# Patient Record
Sex: MALE | Race: WHITE | NOT HISPANIC OR LATINO | Employment: FULL TIME | URBAN - METROPOLITAN AREA
[De-identification: names, ages, dates, MRNs, and addresses within clinical notes are randomized per-mention and may not be internally consistent; named-entity substitution may affect disease eponyms.]

---

## 2023-04-18 RX ORDER — OMEPRAZOLE 40 MG/1
40 CAPSULE, DELAYED RELEASE ORAL DAILY
COMMUNITY
End: 2023-04-20 | Stop reason: SDUPTHER

## 2023-04-18 RX ORDER — AMLODIPINE BESYLATE 5 MG/1
1 TABLET ORAL DAILY
COMMUNITY
Start: 2022-08-06 | End: 2023-09-19 | Stop reason: SDUPTHER

## 2023-04-18 RX ORDER — POTASSIUM CHLORIDE 750 MG/1
1 TABLET, EXTENDED RELEASE ORAL DAILY
COMMUNITY
Start: 2022-08-01

## 2023-04-18 RX ORDER — LOVASTATIN 20 MG/1
20 TABLET ORAL DAILY
COMMUNITY
End: 2023-11-27 | Stop reason: SDUPTHER

## 2023-04-18 RX ORDER — ROSUVASTATIN CALCIUM 20 MG/1
1 TABLET, COATED ORAL DAILY
COMMUNITY
Start: 2022-10-04 | End: 2024-01-22 | Stop reason: SDUPTHER

## 2023-04-18 RX ORDER — BUMETANIDE 1 MG/1
1 TABLET ORAL DAILY
COMMUNITY
Start: 2022-08-01

## 2023-04-18 RX ORDER — MIRTAZAPINE 7.5 MG/1
TABLET, FILM COATED ORAL
COMMUNITY
Start: 2022-07-18

## 2023-04-18 NOTE — PROGRESS NOTES
Subjective   Sven Gardiner is a 67 y.o. male who presents for patient is here for a follow-up.  HPI  Patient is a 67-year-old male with known history of benign prostatic hypertrophy dyslipidemia arthritis Mann's esophagus follows with gastroenterology Dr. Kt Orozco dyslipidemia benign essential hypertension constipation.  Patient is here for follow-up he takes his medication as prescribed patient is here for fasting blood work was major medical denies chest pain shortness of breath fever chills nausea vomiting. Post nasal drip cough.  History of deviated septum  Review of Systems  10 system reviewed patient pertinent as above  Objective     Visit Vitals  /80   Pulse 70   Temp 36.3 °C (97.3 °F)      Physical Exam  HEENT: Atraumatic normocephalic the pupils are equal and round and reactive to light the sclerae nonicteric extraocular motion are intact.  Neck: Is supple without JVD no carotid bruits the trachea is midline there are no masses pulses are equal and bilateral with normal upstroke.  Skin: Normal.  Skin good texture.  Moist.  Good turgor.  No lesions, no rashes.  Lymph: No lymphadenopathy appreciated, no masses, no lesions  Lungs: Are clear to auscultation and percussion, good breath sounds bilaterally, no rhonchi, no wheezing, good diaphragmatic excursion.  Heart: Normal rate and normal rhythm S1, S2, no S3, no gallop, murmur or rub.  Abdomen: Soft, nontender, no organomegaly, good bowel sounds.    Extremities: Full range of motion, good pulses bilateral.  No cyanosis, no clubbing or edema.  Neuro: Cranial nerves II-XII are grossly intact there is no sensory or motor deficits.  Able to move all extremities.      Assessment/Plan     Seasonal allergies  With nasal drip  Sinus congestion clear discharge  Itchy eyes  With otalgia  Start Flonase 1 puff each nostril twice daily    Benign prostatic hypertrophy  On tamsulosin doing well still getting up once or twice  Patient wishes to have better  control  Struct patient drink before bedtime  And finasteride      Patient will come in for fasting blood work in July    CBC BMP lipids AST ALT vitamin D 25-hydroxy    Continue with the low-fat, low-cholesterol diet,  I recommended Mediterranean diet, which include fish, chicken, vegetables and olive oil  Exercise daily for 30 minutes at least 3 times a week  Continue home medications    Hypertension  No added salt diet, do not and salt to your food  Try to exercise every other day for 30 minutes  Continue current medications    Gastroesophageal reflux disease  Continue current medications  Include spicy foods do not eat late at night  Do not wear tight fitting clothes  Exercise daily    Insomnia  On mirtazapine    Constipation  Follows with gastroenterology  We discussed the possibility of stopping amlodipine as a cause of constipation  Problem List Items Addressed This Visit          Nervous    Spinal stenosis - Primary       Circulatory    HTN (hypertension), benign       Digestive    Esophageal reflux       Genitourinary    BPH (benign prostatic hyperplasia)    Relevant Medications    finasteride (Proscar) 5 mg tablet    omeprazole (PriLOSEC) 40 mg DR capsule       Endocrine/Metabolic    Hyperthyroidism       Other    Dyslipidemia     Other Visit Diagnoses       Seasonal allergies        Relevant Medications    fluticasone (Flonase) 50 mcg/actuation nasal spray              Didier Pennington MD

## 2023-04-20 ENCOUNTER — OFFICE VISIT (OUTPATIENT)
Dept: PRIMARY CARE | Facility: CLINIC | Age: 68
End: 2023-04-20
Payer: COMMERCIAL

## 2023-04-20 VITALS
TEMPERATURE: 97.3 F | HEIGHT: 69 IN | BODY MASS INDEX: 27.7 KG/M2 | SYSTOLIC BLOOD PRESSURE: 120 MMHG | WEIGHT: 187 LBS | HEART RATE: 70 BPM | DIASTOLIC BLOOD PRESSURE: 80 MMHG

## 2023-04-20 DIAGNOSIS — K21.00 GASTROESOPHAGEAL REFLUX DISEASE WITH ESOPHAGITIS, UNSPECIFIED WHETHER HEMORRHAGE: ICD-10-CM

## 2023-04-20 DIAGNOSIS — E05.90 HYPERTHYROIDISM: ICD-10-CM

## 2023-04-20 DIAGNOSIS — J30.2 SEASONAL ALLERGIES: ICD-10-CM

## 2023-04-20 DIAGNOSIS — N40.1 BENIGN PROSTATIC HYPERPLASIA WITH LOWER URINARY TRACT SYMPTOMS, SYMPTOM DETAILS UNSPECIFIED: ICD-10-CM

## 2023-04-20 DIAGNOSIS — M48.00 SPINAL STENOSIS, UNSPECIFIED SPINAL REGION: Primary | ICD-10-CM

## 2023-04-20 DIAGNOSIS — I10 HTN (HYPERTENSION), BENIGN: ICD-10-CM

## 2023-04-20 DIAGNOSIS — E78.5 DYSLIPIDEMIA: ICD-10-CM

## 2023-04-20 PROBLEM — R09.81 NASAL CONGESTION: Status: ACTIVE | Noted: 2023-04-20

## 2023-04-20 PROBLEM — K21.9 ESOPHAGEAL REFLUX: Status: ACTIVE | Noted: 2023-04-20

## 2023-04-20 PROBLEM — D75.1 POLYCYTHEMIA: Status: ACTIVE | Noted: 2023-04-20

## 2023-04-20 PROBLEM — F41.9 ANXIETY: Status: ACTIVE | Noted: 2023-04-20

## 2023-04-20 PROBLEM — G25.81 RESTLESS LEGS: Status: ACTIVE | Noted: 2023-04-20

## 2023-04-20 PROBLEM — K22.70 BARRETT'S ESOPHAGUS WITHOUT DYSPLASIA: Status: ACTIVE | Noted: 2023-04-20

## 2023-04-20 PROBLEM — H69.91 DYSFUNCTION OF RIGHT EUSTACHIAN TUBE: Status: ACTIVE | Noted: 2023-04-20

## 2023-04-20 PROBLEM — N40.0 BPH (BENIGN PROSTATIC HYPERPLASIA): Status: ACTIVE | Noted: 2023-04-20

## 2023-04-20 PROBLEM — R06.83 SNORING: Status: ACTIVE | Noted: 2023-04-20

## 2023-04-20 PROBLEM — R07.89 CHEST PAIN, ATYPICAL: Status: ACTIVE | Noted: 2023-04-20

## 2023-04-20 PROBLEM — J34.2 NASAL SEPTAL DEVIATION: Status: ACTIVE | Noted: 2023-04-20

## 2023-04-20 PROBLEM — M23.204 OLD TEAR OF MEDIAL MENISCUS OF LEFT KNEE: Status: ACTIVE | Noted: 2023-04-20

## 2023-04-20 PROBLEM — J34.3 HYPERTROPHY OF INFERIOR NASAL TURBINATE: Status: ACTIVE | Noted: 2023-04-20

## 2023-04-20 PROCEDURE — 3074F SYST BP LT 130 MM HG: CPT | Performed by: INTERNAL MEDICINE

## 2023-04-20 PROCEDURE — 1036F TOBACCO NON-USER: CPT | Performed by: INTERNAL MEDICINE

## 2023-04-20 PROCEDURE — 99214 OFFICE O/P EST MOD 30 MIN: CPT | Performed by: INTERNAL MEDICINE

## 2023-04-20 PROCEDURE — 1159F MED LIST DOCD IN RCRD: CPT | Performed by: INTERNAL MEDICINE

## 2023-04-20 PROCEDURE — 3079F DIAST BP 80-89 MM HG: CPT | Performed by: INTERNAL MEDICINE

## 2023-04-20 RX ORDER — FLUTICASONE PROPIONATE 50 MCG
1 SPRAY, SUSPENSION (ML) NASAL 2 TIMES DAILY
Qty: 16 G | Refills: 5 | Status: SHIPPED | OUTPATIENT
Start: 2023-04-20 | End: 2023-09-11

## 2023-04-20 RX ORDER — OMEPRAZOLE 40 MG/1
40 CAPSULE, DELAYED RELEASE ORAL DAILY
Qty: 90 CAPSULE | Refills: 3 | Status: SHIPPED | OUTPATIENT
Start: 2023-04-20 | End: 2023-07-24 | Stop reason: SDUPTHER

## 2023-04-20 RX ORDER — FINASTERIDE 5 MG/1
5 TABLET, FILM COATED ORAL DAILY
Qty: 30 TABLET | Refills: 11 | Status: SHIPPED | OUTPATIENT
Start: 2023-04-20 | End: 2023-07-03

## 2023-04-20 ASSESSMENT — ENCOUNTER SYMPTOMS
OCCASIONAL FEELINGS OF UNSTEADINESS: 0
DEPRESSION: 0
LOSS OF SENSATION IN FEET: 1

## 2023-04-20 ASSESSMENT — COLUMBIA-SUICIDE SEVERITY RATING SCALE - C-SSRS
1. IN THE PAST MONTH, HAVE YOU WISHED YOU WERE DEAD OR WISHED YOU COULD GO TO SLEEP AND NOT WAKE UP?: NO
2. HAVE YOU ACTUALLY HAD ANY THOUGHTS OF KILLING YOURSELF?: NO
6. HAVE YOU EVER DONE ANYTHING, STARTED TO DO ANYTHING, OR PREPARED TO DO ANYTHING TO END YOUR LIFE?: NO

## 2023-04-20 ASSESSMENT — PAIN SCALES - GENERAL: PAINLEVEL: 0-NO PAIN

## 2023-04-20 ASSESSMENT — PATIENT HEALTH QUESTIONNAIRE - PHQ9
1. LITTLE INTEREST OR PLEASURE IN DOING THINGS: NOT AT ALL
SUM OF ALL RESPONSES TO PHQ9 QUESTIONS 1 AND 2: 0
2. FEELING DOWN, DEPRESSED OR HOPELESS: NOT AT ALL

## 2023-05-13 DIAGNOSIS — N40.1 BENIGN PROSTATIC HYPERPLASIA WITH LOWER URINARY TRACT SYMPTOMS, SYMPTOM DETAILS UNSPECIFIED: ICD-10-CM

## 2023-06-18 DIAGNOSIS — N40.0 BENIGN PROSTATIC HYPERPLASIA WITHOUT LOWER URINARY TRACT SYMPTOMS: ICD-10-CM

## 2023-07-03 RX ORDER — FINASTERIDE 5 MG/1
5 TABLET, FILM COATED ORAL DAILY
Qty: 30 TABLET | Refills: 11 | Status: SHIPPED | OUTPATIENT
Start: 2023-07-03 | End: 2024-05-20 | Stop reason: SDUPTHER

## 2023-07-03 RX ORDER — TAMSULOSIN HYDROCHLORIDE 0.4 MG/1
CAPSULE ORAL
Qty: 90 CAPSULE | Refills: 0 | Status: SHIPPED | OUTPATIENT
Start: 2023-07-03 | End: 2023-09-25 | Stop reason: SDUPTHER

## 2023-07-20 NOTE — PROGRESS NOTES
Subjective   Sven Gardiner is a 67 y.o. male who presents for here for follow-up.  HPI  Sven is 67 male here for follow-up benign prostatic hypertrophy dyslipidemia hypertension gastroesophageal reflux disease in the setting of calcium channel blocker, patient is here for follow-up.  Denies chest pain shortness of breath fever chills nausea vomiting constipation diarrhea dysuria urgency frequency.  Is here for fasting blood work Complaining of fatigue  took Z quill does well good sleep.  Rt shoulder pain   Review of Systems  10 system Reviewed pertinent as above   Objective     Visit Vitals  /84   Pulse 78   Temp 36.7 °C (98.1 °F)   Resp 15      Physical Exam  HEENT: Atraumatic normocephalic the pupils are equal and round and reactive to light the sclerae nonicteric extraocular motion are intact.  Neck: Is supple without JVD no carotid bruits the trachea is midline there are no masses pulses are equal and bilateral with normal upstroke.  Skin: Normal.  Skin good texture.  Moist.  Good turgor.  No lesions, no rashes.  Lymph: No lymphadenopathy appreciated, no masses, no lesions  Lungs: Are clear to auscultation and percussion, good breath sounds bilaterally, no rhonchi, no wheezing, good diaphragmatic excursion.  Heart: Normal rate and normal rhythm S1, S2, no S3, no gallop, murmur or rub.  Abdomen: Soft, nontender, no organomegaly, good bowel sounds.    Extremities: Full range of motion, good pulses bilateral.  No cyanosis, no clubbing or edema.  Neuro: Cranial nerves II-XII are grossly intact there is no sensory or motor deficits.  Able to move all extremities.      Assessment/Plan     We will need fasting blood works    Last blood test done September 30, 2022    Patient wishes to return for blood work since he ate today recommended nursing visit  CBC BMP lipids AST LT vitamin D 20 D 25-hydroxy PSA    Rotator Cuff Tendinis  Exercise stretching  If not improved  Refer to orthopedic surgery    Ortho  follows  For lower back pain recommended  Physical Therapy   Possible MRI if not better    Continue with the low-fat, low-cholesterol diet,  I recommended Mediterranean diet, which include fish, chicken, vegetables and olive oil  Exercise daily for 30 minutes at least 3 times a week  Continue home medications    Benign prostatic hypertrophy  On tamsulosin  Added finasteride    Hypertension  No added salt diet, do not and salt to your food  Try to exercise every other day for 30 minutes  Continue current medications    Chronic constipation  Fiber fluids  May need to stop amlodipine    Gastroesophageal reflux disease  Continue current medications  Include spicy foods do not eat late at night  Do not wear tight fitting clothes  Exercise daily    Insomnia on mirtazapine    Will need Fasting blood ntest  Ate today6    Problem List Items Addressed This Visit       BPH (benign prostatic hyperplasia)    Relevant Medications    omeprazole (PriLOSEC) 40 mg DR capsule     Other Visit Diagnoses       Oral thrush    -  Primary    Relevant Medications    clotrimazole (Mycelex) 10 mg rosa    Irritable bowel syndrome with predominant constipation        Relevant Medications    polyethylene glycol (Glycolax, Miralax) 17 gram packet              Didier Pennington MD

## 2023-07-24 ENCOUNTER — OFFICE VISIT (OUTPATIENT)
Dept: PRIMARY CARE | Facility: CLINIC | Age: 68
End: 2023-07-24
Payer: COMMERCIAL

## 2023-07-24 VITALS
BODY MASS INDEX: 27.99 KG/M2 | TEMPERATURE: 98.1 F | HEIGHT: 69 IN | WEIGHT: 189 LBS | HEART RATE: 78 BPM | DIASTOLIC BLOOD PRESSURE: 84 MMHG | SYSTOLIC BLOOD PRESSURE: 128 MMHG | RESPIRATION RATE: 15 BRPM

## 2023-07-24 DIAGNOSIS — K58.1 IRRITABLE BOWEL SYNDROME WITH PREDOMINANT CONSTIPATION: ICD-10-CM

## 2023-07-24 DIAGNOSIS — N40.1 BENIGN PROSTATIC HYPERPLASIA WITH LOWER URINARY TRACT SYMPTOMS, SYMPTOM DETAILS UNSPECIFIED: ICD-10-CM

## 2023-07-24 DIAGNOSIS — B37.0 ORAL THRUSH: Primary | ICD-10-CM

## 2023-07-24 PROCEDURE — 99214 OFFICE O/P EST MOD 30 MIN: CPT | Performed by: INTERNAL MEDICINE

## 2023-07-24 PROCEDURE — 3079F DIAST BP 80-89 MM HG: CPT | Performed by: INTERNAL MEDICINE

## 2023-07-24 PROCEDURE — 3074F SYST BP LT 130 MM HG: CPT | Performed by: INTERNAL MEDICINE

## 2023-07-24 PROCEDURE — 1036F TOBACCO NON-USER: CPT | Performed by: INTERNAL MEDICINE

## 2023-07-24 PROCEDURE — 1159F MED LIST DOCD IN RCRD: CPT | Performed by: INTERNAL MEDICINE

## 2023-07-24 PROCEDURE — 1125F AMNT PAIN NOTED PAIN PRSNT: CPT | Performed by: INTERNAL MEDICINE

## 2023-07-24 RX ORDER — OMEPRAZOLE 40 MG/1
40 CAPSULE, DELAYED RELEASE ORAL DAILY
Qty: 90 CAPSULE | Refills: 3 | Status: SHIPPED | OUTPATIENT
Start: 2023-07-24 | End: 2023-07-24 | Stop reason: SDUPTHER

## 2023-07-24 RX ORDER — OMEPRAZOLE 40 MG/1
40 CAPSULE, DELAYED RELEASE ORAL DAILY
Qty: 90 CAPSULE | Refills: 3 | Status: SHIPPED | OUTPATIENT
Start: 2023-07-24

## 2023-07-24 RX ORDER — CLOTRIMAZOLE 10 MG/1
10 LOZENGE ORAL; TOPICAL
Qty: 50 TABLET | Refills: 0 | Status: SHIPPED | OUTPATIENT
Start: 2023-07-24 | End: 2023-08-03

## 2023-07-24 RX ORDER — POLYETHYLENE GLYCOL 3350 17 G/17G
17 POWDER, FOR SOLUTION ORAL DAILY
Qty: 238 G | Refills: 3 | Status: SHIPPED | OUTPATIENT
Start: 2023-07-24

## 2023-07-24 RX ORDER — POLYETHYLENE GLYCOL 3350 17 G/17G
17 POWDER, FOR SOLUTION ORAL DAILY
Qty: 30 PACKET | Refills: 3 | Status: SHIPPED | OUTPATIENT
Start: 2023-07-24 | End: 2023-07-24

## 2023-07-24 ASSESSMENT — PAIN SCALES - GENERAL: PAINLEVEL: 2

## 2023-09-09 DIAGNOSIS — J30.2 SEASONAL ALLERGIES: ICD-10-CM

## 2023-09-11 RX ORDER — FLUTICASONE PROPIONATE 50 MCG
1 SPRAY, SUSPENSION (ML) NASAL 2 TIMES DAILY
Qty: 48 ML | Refills: 1 | Status: SHIPPED | OUTPATIENT
Start: 2023-09-11 | End: 2024-02-21 | Stop reason: SDUPTHER

## 2023-09-11 NOTE — TELEPHONE ENCOUNTER
Mr. Gardiner called office today with update that he had elevated blood pressure over the weekend 170/97 then it came down to 145/87.  He also has a headache and not sure all of what to do.  He's drinking water, but probably not enough.  He would like to speak with you and if you can advise what to do.  He's scheduled for a in office visit 9/19/23.

## 2023-09-12 NOTE — PROGRESS NOTES
Subjective   Sven Gardiner is a 68 y.o. male who presents for here for follow-up.  HPI  Sven is 67 male history of benign essential hypertension gastroesophageal reflux disease enlarged prostate dyslipidemia takes his medication as prescribed last blood work from September 30, 2022 elevated LDL cholesterol low vitamin D patient is here to discuss a few issues, voice unremarkable denies chest pain shortness of breath fever chills nausea vomiting constipation diarrhea dysuria urgency frequency  Review of Systems  10 system review pertinent as above  Objective     Visit Vitals  /80   Pulse 74   Temp 36.5 °C (97.7 °F)   Resp 15      Physical Exam    HEENT: Atraumatic normocephalic the pupils are equal and round and reactive to light the sclerae nonicteric extraocular motion are intact.  Neck: Is supple without JVD no carotid bruits the trachea is midline there are no masses pulses are equal and bilateral with normal upstroke.  Skin: Normal.  Skin good texture.  Moist.  Good turgor.  No lesions, no rashes.  Lymph: No lymphadenopathy appreciated, no masses, no lesions  Lungs: Are clear to auscultation and percussion, good breath sounds bilaterally, no rhonchi, no wheezing, good diaphragmatic excursion.  Heart: Normal rate and normal rhythm S1, S2, no S3, no gallop, murmur or rub.  Abdomen: Soft, nontender, no organomegaly, good bowel sounds.    Extremities: Full range of motion, good pulses bilateral.  No cyanosis, no clubbing or edema.  Neuro: Cranial nerves II-XII are grossly intact there is no sensory or motor deficits.  Able to move all extremities.    Assessment/Plan     Here for follow-up    Fasting blood work    CBC BMP lipids AST LT vitamin D 25 with    Continue with the low-fat, low-cholesterol diet,  I recommended Mediterranean diet, which include fish, chicken, vegetables and olive oil  Exercise daily for 30 minutes at least 3 times a week  Continue home medications  Norvasc 5 mg daily.   CACS score  is 39.41 11/2022    Hypertension  No added salt diet, do not and salt to your food  Try to exercise every other day for 30 minutes  Continue current medications    Large prostate  Normal PSA  Will recheck      Problem List Items Addressed This Visit       BPH (benign prostatic hyperplasia)    Dyslipidemia    Relevant Orders    Lipid Panel    AST    ALT    Esophageal reflux    Relevant Orders    CBC    HTN (hypertension), benign - Primary    Relevant Medications    amLODIPine (Norvasc) 5 mg tablet    Other Relevant Orders    Basic Metabolic Panel    Hyperlipidemia     Other Visit Diagnoses       Nocturia        Relevant Orders    PSA              Didier Pennington MD

## 2023-09-19 ENCOUNTER — OFFICE VISIT (OUTPATIENT)
Dept: PRIMARY CARE | Facility: CLINIC | Age: 68
End: 2023-09-19
Payer: COMMERCIAL

## 2023-09-19 VITALS
DIASTOLIC BLOOD PRESSURE: 80 MMHG | SYSTOLIC BLOOD PRESSURE: 126 MMHG | BODY MASS INDEX: 28.44 KG/M2 | WEIGHT: 192 LBS | HEART RATE: 74 BPM | TEMPERATURE: 97.7 F | HEIGHT: 69 IN | RESPIRATION RATE: 15 BRPM

## 2023-09-19 DIAGNOSIS — E78.2 MODERATE MIXED HYPERLIPIDEMIA NOT REQUIRING STATIN THERAPY: ICD-10-CM

## 2023-09-19 DIAGNOSIS — K21.00 GASTROESOPHAGEAL REFLUX DISEASE WITH ESOPHAGITIS, UNSPECIFIED WHETHER HEMORRHAGE: ICD-10-CM

## 2023-09-19 DIAGNOSIS — E78.5 DYSLIPIDEMIA: ICD-10-CM

## 2023-09-19 DIAGNOSIS — N40.1 BENIGN PROSTATIC HYPERPLASIA WITH LOWER URINARY TRACT SYMPTOMS, SYMPTOM DETAILS UNSPECIFIED: ICD-10-CM

## 2023-09-19 DIAGNOSIS — R35.1 NOCTURIA: ICD-10-CM

## 2023-09-19 DIAGNOSIS — I10 HTN (HYPERTENSION), BENIGN: Primary | ICD-10-CM

## 2023-09-19 PROCEDURE — 84153 ASSAY OF PSA TOTAL: CPT | Performed by: INTERNAL MEDICINE

## 2023-09-19 PROCEDURE — 99214 OFFICE O/P EST MOD 30 MIN: CPT | Performed by: INTERNAL MEDICINE

## 2023-09-19 PROCEDURE — 1036F TOBACCO NON-USER: CPT | Performed by: INTERNAL MEDICINE

## 2023-09-19 PROCEDURE — 80048 BASIC METABOLIC PNL TOTAL CA: CPT | Performed by: INTERNAL MEDICINE

## 2023-09-19 PROCEDURE — 84450 TRANSFERASE (AST) (SGOT): CPT | Performed by: INTERNAL MEDICINE

## 2023-09-19 PROCEDURE — 1126F AMNT PAIN NOTED NONE PRSNT: CPT | Performed by: INTERNAL MEDICINE

## 2023-09-19 PROCEDURE — 3074F SYST BP LT 130 MM HG: CPT | Performed by: INTERNAL MEDICINE

## 2023-09-19 PROCEDURE — 84460 ALANINE AMINO (ALT) (SGPT): CPT | Performed by: INTERNAL MEDICINE

## 2023-09-19 PROCEDURE — 1159F MED LIST DOCD IN RCRD: CPT | Performed by: INTERNAL MEDICINE

## 2023-09-19 PROCEDURE — 80061 LIPID PANEL: CPT | Performed by: INTERNAL MEDICINE

## 2023-09-19 PROCEDURE — 3079F DIAST BP 80-89 MM HG: CPT | Performed by: INTERNAL MEDICINE

## 2023-09-19 PROCEDURE — 85025 COMPLETE CBC W/AUTO DIFF WBC: CPT | Performed by: INTERNAL MEDICINE

## 2023-09-19 RX ORDER — AMLODIPINE BESYLATE 5 MG/1
5 TABLET ORAL DAILY
Qty: 30 TABLET | Refills: 3 | Status: SHIPPED | OUTPATIENT
Start: 2023-09-19 | End: 2024-09-18

## 2023-09-19 ASSESSMENT — PAIN SCALES - GENERAL: PAINLEVEL: 0-NO PAIN

## 2023-09-25 DIAGNOSIS — N40.0 BENIGN PROSTATIC HYPERPLASIA WITHOUT LOWER URINARY TRACT SYMPTOMS: ICD-10-CM

## 2023-09-26 RX ORDER — TAMSULOSIN HYDROCHLORIDE 0.4 MG/1
0.4 CAPSULE ORAL NIGHTLY
Qty: 90 CAPSULE | Refills: 1 | Status: SHIPPED | OUTPATIENT
Start: 2023-09-26

## 2023-10-19 ENCOUNTER — TELEMEDICINE (OUTPATIENT)
Dept: PRIMARY CARE | Facility: CLINIC | Age: 68
End: 2023-10-19
Payer: COMMERCIAL

## 2023-10-19 DIAGNOSIS — J20.9 ACUTE BRONCHITIS, UNSPECIFIED ORGANISM: Primary | ICD-10-CM

## 2023-10-19 PROCEDURE — 99214 OFFICE O/P EST MOD 30 MIN: CPT | Performed by: INTERNAL MEDICINE

## 2023-10-19 RX ORDER — AMOXICILLIN AND CLAVULANATE POTASSIUM 500; 125 MG/1; MG/1
500 TABLET, FILM COATED ORAL 3 TIMES DAILY
Qty: 30 TABLET | Refills: 0 | Status: SHIPPED | OUTPATIENT
Start: 2023-10-19 | End: 2023-10-29

## 2023-10-19 NOTE — PROGRESS NOTES
Subjective   Sven Gardiner is a 68 y.o. male who presents for here for right shoulder visit.  HPI  Sven is 67 male history of benign essential hypertension gastroesophageal reflux disease enlarged prostate dyslipidemia takes his medication as prescribed last blood work from September 30, 2022 elevated LDL cholesterol low vitamin D patient is here for virtual visit complaining of persistent cough postnasal drip now with grayish discharge is no fever chills nausea vomiting, no diaphoresis.  Complaining of persistent sinus pressure and postnasal drip wishes to see an ear nose and throat specialist  Review of Systems  10 system review pertinent as above  Objective   Virtual visit  There were no vitals taken for this visit.     Physical Exam    Virtual visit    Assessment/Plan     Virtual visit    Reviewed fasting blood work.  Patient    Elevated LDL cholesterol  In the setting of normal cardiac calcium score  I recommend diet exercise weight loss  And repeat blood test in 6 months    Persistent postnasal drip  Persistent sinusitis  Refer patient to ear nose and throat for further evaluation    Acute bronchitis  In the setting of postnasal drip  We discussed the possibilities of antibiotic to  Especially denies any change of his sputum consistency from clear to grayish  I have given him Augmentin 500 mg twice a day  To take for period of  7 days  If symptoms continue to improve  Over the next day or 2 do not use antibiotic      Continue with the low-fat, low-cholesterol diet,  I recommended Mediterranean diet, which include fish, chicken, vegetables and olive oil  Exercise daily for 30 minutes at least 3 times a week  Continue home medications  Norvasc 5 mg daily.   CACS score is 39.41 11/2022    Hypertension  No added salt diet, do not and salt to your food  Try to exercise every other day for 30 minutes  Continue current medications    Large prostate  Normal PSA  Will recheck      Problem List Items Addressed This  Visit    None  Visit Diagnoses       Acute bronchitis, unspecified organism    -  Primary    Relevant Medications    amoxicillin-pot clavulanate (Augmentin) 500-125 mg tablet    Other Relevant Orders    Referral to ENT              Didier Pennington MD

## 2023-10-24 DIAGNOSIS — M48.02 SPINAL STENOSIS OF CERVICAL REGION: ICD-10-CM

## 2023-11-07 ENCOUNTER — HOSPITAL ENCOUNTER (OUTPATIENT)
Dept: RADIOLOGY | Facility: HOSPITAL | Age: 68
Discharge: HOME | End: 2023-11-07
Payer: COMMERCIAL

## 2023-11-07 DIAGNOSIS — M48.02 SPINAL STENOSIS OF CERVICAL REGION: ICD-10-CM

## 2023-11-07 PROCEDURE — 72141 MRI NECK SPINE W/O DYE: CPT

## 2023-11-07 PROCEDURE — 72141 MRI NECK SPINE W/O DYE: CPT | Performed by: RADIOLOGY

## 2023-11-21 NOTE — PATIENT INSTRUCTIONS
Your exam today showed that you have chronic sinusitis with nasal polyps and inferior turbinate hypertrophy.    Please get a CT scan of your sinuses at a  facility. Please contact our scheduling and  service at [phone]. They will work with you to schedule your CT scan.    Continue using Flonase nasal spray. Do 1 spray in each nostril twice a day. There is less nasal drying when you space out the Flonase spray to one spray in the morning and one spray in the evening. If that is not possible, you can instead do 2 sprays in each nostril once a day. Nasal spray instructions are below.    Start using azelastine nasal spray. Do 2 sprays in each nostril twice a day. Nasal spray instructions are below.    Nasal spray instructions:  Please take the prescribed nasal sprays (Flonase and azelastine) as directed. BE SURE TO POINT THE SPRAY OUTWARDS TOWARDS THE CORNER OF THE EYE ON THE SAME SIDE NOSTRIL. This will ensure you are treating the appropriate parts of your nose that are swollen or inflamed. Also, avoid sniffing in the spray through your nose as this will cause the spray to go towards the back of your nose and down the back of your throat. Instead, blow out through your mouth while spraying into your nostril. This elevates the soft palate in the back of your mouth which helps the spray to stay in your nose. After spraying, if the fluid starts dripping out of your nose, you can sniff gently to keep the fluid in your nose. It will take take up to 1 month of using Flonase before you notice full benefit. You must use Flonase every day for it to be effective.    Please take omeprazole at least 30 minutes before your evening meal.    Please follow up with Dr. Sawyer in 4-6 weeks for reevaluation or sooner with any questions or concerns. Please feel free to contact his office by calling [phone] with any  questions.  _________________________________________________________________________________________    Welcome to Dr. Colten Fabian’s clinic. We are here to assist you through your ENT care at [facility]. Dr. Colten Fabian is a Rhinologist who is an expert with advanced fellowship training in Endoscopic Sinus Surgery and Skull Base Surgery.    Dr. Colten Fabian’s office number is [phone]. Please use this number to contact his care team regardless of which office you use to access care. This number is the most direct way to communicate with all the members of the care team.    Rylie Arambula CNP is a nurse practitioner who is a part of Dr. Fabian’s team. She will work collaboratively with Dr. Fabian to meet your goals. This often may include seeing you for more urgent appointments or follow-up visits. She follows the same protocols and guidelines for chronic management of your condition.    Hoda Lynch RN is Dr. Fabian’s primary nurse. She can be reached by calling the office as well. Hoda is in clinic Monday through Friday. Non Urgent calls will be returned within 24 hours of the call.    Alice Cleary is Dr. Fabian’s  and she answers the office phone from 9am-4pm Mon-Thurs. On Friday, she answers the phone from 9am-3pm. Call [phone]. She can help you with scheduling of appointments, general questions and information. You may need to leave a message if she is helping another patient. In this case, someone from the team will call you back the same day if you leave your message before 3pm, or the next business morning if after 3pm.    For your convenience, Dr. Fabian sees patients at different [facility] locations including the [facility] and at [facility]. While we try to make your appointments as convenient as possible, occasionally a visit to another location may be necessary to provide the best care for you.    Dr. Fabian makes every effort to run  on time for your appointments. Therefore, if you are more than 15 to 20 minutes late, your appointment may need to be rescheduled to another day. We appreciate your understanding.    We look forward to working with you to meet your healthcare goals.    Scribe Attestation  By signing my name below, I, Cassius Russ, attest that this documentation has been prepared under the direction and in the presence of Colten Sawyer MD. All medical record entries made by the Michael[facility] were at my direction or personally dictated by me. I have reviewed the chart and agree that the record accurately reflects my personal performance of the history, physical exam, discussion and plan.

## 2023-11-21 NOTE — PROGRESS NOTES
HPI   Sven Gardiner is a 68 y.o. male, referred by Didier Pennington MD. The patient presents with concerns of sinus and nose problems that began several years ago. He reports that antibiotics improve sinus symptoms. However, he still has chronic symptoms all year round including #1 nasal drainage, and #2 bilateral nasal obstruction worse on the right. He also reports concern of a sensation of something in his throat. Patient denies facial pressure, facial pain, periorbital edema, epiphora, loss of taste, loss of smell, and epistaxis. The patient has taken Flonase 2 sprays in each nostril once daily for 6-7 months. He has been taking omeprazole 1 tablet daily. Flonase provides some relief. The patient has tried nasal saline irrigations via Navage. This provides relief for a couple hours. Does have a history of allergic rhinitis or allergies. He denies a history of aspirin sensitivity. He reports 2-3 sinus infections per year requiring antibiotic treatment.    The patient reports a history of nasal surgery in approximately  for a broken nose.  Social: Smokes 2 cigarettes per week.    Past Medical History:   Diagnosis Date    Acute maxillary sinusitis, unspecified 2016    Acute maxillary sinusitis, unspecified    Cough, unspecified 2016    Cough    Encounter for screening, unspecified 2016    Screening    Personal history of other specified conditions 10/09/2015    History of abdominal pain       Past Surgical History:   Procedure Laterality Date    OTHER SURGICAL HISTORY  2022    Appendectomy       Social History     Socioeconomic History    Marital status:      Spouse name: Berenice    Number of children: 2    Years of education: Not on file    Highest education level: Not on file   Occupational History    Occupation: Computers   Tobacco Use    Smoking status: Former     Packs/day: .25     Types: Cigarettes     Start date:      Quit date:      Years since quittin.8      Passive exposure: Never    Smokeless tobacco: Never   Vaping Use    Vaping Use: Never used   Substance and Sexual Activity    Alcohol use: Yes     Comment: socially    Drug use: Never    Sexual activity: Yes     Partners: Female   Other Topics Concern    Not on file   Social History Narrative    Not on file     Social Determinants of Health     Financial Resource Strain: Not on file   Food Insecurity: Not on file   Transportation Needs: Not on file   Physical Activity: Not on file   Stress: Not on file   Social Connections: Not on file   Intimate Partner Violence: Not on file   Housing Stability: Not on file       Family History   Problem Relation Name Age of Onset    Heart attack Mother      Diabetes Mother      Ulcers Father         No Known Allergies    Medication Documentation Review Audit       Reviewed by Jeanne Jasmine MA (Medical Assistant) on 09/19/23 at 1525      Medication Order Taking? Sig Documenting Provider Last Dose Status   amLODIPine (Norvasc) 5 mg tablet [id number] No Take 1 tablet (5 mg) by mouth once daily. Kris Lazaro MD Taking Active   bumetanide (Bumex) 1 mg tablet [id number] No Take 1 tablet (1 mg) by mouth once daily. Kris Provider, MD Not Taking Active   finasteride (Proscar) 5 mg tablet [id number]  TAKE 1 TABLET (5 MG) BY MOUTH ONCE DAILY. DO NOT CRUSH, CHEW, OR SPLIT. Didier Pennington MD  Active   fluticasone (Flonase) 50 mcg/actuation nasal spray [id number]  ADMINISTER 1 SPRAY INTO EACH NOSTRIL IN THE MORNING AND 1 SPRAY BEFORE BEDTIME. SHAKE GENTLY. BEFORE FIRST USE, PRIME PUMP. AFTER USE, CLEAN TIP AND REPLACE CAP.. Didier Pennington MD  Active   lovastatin (Mevacor) 20 mg tablet [id number] No Take 1 tablet (20 mg) by mouth once daily. Historical Provider, MD Taking Active   mirtazapine (Remeron) 7.5 mg tablet [id number] No Take by mouth. Kris Provider, MD Not Taking Active   omeprazole (PriLOSEC) 40 mg DR capsule [id number]  Take 1 capsule (40 mg) by mouth once daily. Didier Pennington MD   Active   polyethylene glycol (Glycolax, Miralax) 17 gram/dose powder [id number]  DISSOLVE 17 GRAMS IN 8 OZ OF FLUID LIQUID DRINK DAILY AS DIRECTED Didier Pennington MD  Active   potassium chloride CR 10 mEq ER tablet [id number] No Take 1 tablet (10 mEq) by mouth once daily. Historical Provider, MD Not Taking Active   rosuvastatin (Crestor) 20 mg tablet [id number] No Take 1 tablet (20 mg) by mouth once daily. Historical Provider, MD Not Taking Active   tamsulosin (Flomax) 0.4 mg 24 hr capsule [id number]  TAKE 1 CAPSULE BY MOUTH EVERYDAY AT BEDTIME Didier Pennington MD  Active                    Review of Systems   Negative for constitutional, eyes, cardiac, pulmonary, hepatic, renal, digestive, hematologic, epileptic, syncopal, musculo-skeletal, mental health, integumentary, hypertensive, lipid, arthritic, diabetic, thyroid or neurologic disorders (except as listed in the HPI, PMH and Problem List).    Assessment   68 y.o. 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  11/22/23 - Obstruction/drainage, right sided posterior superior turb polyp, ITH, DNS, concern for CRS. Completed 10 days of Augmentin. CT sinus ordered. Continue Flonase. Rx Azelastine. Continue omeprazole and use before evening meal.    Plan   I personally reviewed the note from Dr. Pennington's office from 10/19/2023. This is contributing to my history and assessment: The patient was diagnosed with persistent postnasal drip, sinusitis, and bronchitis, and was referred to ENT. He was given Augmentin antibiotics for 10 days at that time.    I personally reviewed the patient's x-ray sinus report. I discussed the results personally with the patient. The following findings were discussed: X-ray Sinus Report: 12/27/2021: Reports normal paranasal sinuses.    I personally reviewed the patient's x-ray sinus  report. I discussed the results personally with the patient. The following findings were discussed: X-ray Sinus Report: 10/11/2013: Reports a large retention cyst or polyp in the left maxillary sinus.    Nasal endoscopy. Findings: as noted.    Reassurance and counseling was provided to the patient, and his condition was extensively discussed, including as noted below:  Patient was instructed to obtain a CT scan of the sinuses without contrast.  Continue Flonase nasal spray, 1 spray in each nostril BID. Patient was instructed on the proper technique of aiming towards the lateral wall of the nose on each side.  Rx Azelastine. Patient was prescribed azelastine nasal spray, 2 sprays in each nostril BID. Patient was instructed on the proper technique of aiming towards the lateral wall of the nose on each side.  Continue omeprazole. Patient was instructed to take omeprazole at least 30 minutes before your evening meal.  Follow up in 4-6 weeks.    Referring Provider: Didier Pennington MD  I will provide a report to the referring provider via the electronic medical record or US mail.    Colten Sawyer MD [facility]  Division of Rhinology, Sinus, and Skull Base Surgery       ENT Physical Exam  General: This is a healthy appearing male who appears his stated age. The patient is alert and appropriately verbally conversant without hoarseness.    Face: The face was inspected and no cutaneous masses or lesions were visualized. There was no erythema or edema noted. Facial movement was symmetric without weakness. No skin lesions were detected. There was no sinus tenderness elicited. The parotid and submandibular glands were normal to palpation.    Eyes: Extra-ocular muscle function was intact. No nystagmus was observed. Pupils were equal.    Cranial Nerves: Cranial nerves II, III, IV, and VI were noted to be intact via extra-ocular muscle movement testing. Cranial nerve VII noted to be intact and symmetric by facial movement. Cranial nerve  VIII was tested with whispered voice examination and revealed symmetric hearing. Cranial nerves IX and X noted to be intact by gag reflex and palatal movement. Cranial nerve XII noted to be intact by active and symmetric tongue movement.    Nose: Examination of the nose revealed the nasal dorsum to be midline. Intranasal exam reveals the septum was deviated gently to the right anteriorly. The inferior turbinates were extremely hypertrophic. No masses, polyps, mucopus, or other lesion on anterior rhinoscopy. See below procedure note as applicable for further exam.    Oral Cavity: Examination of the oral cavity revealed no mass lesions nor infection. The palate was noted to be intact without evidence of clefting. The tongue exhibited normal mobility. Mucosa was moist without lesion. The lips were free of lesion. Gums were free of inflammation. Dentition: normal without obvious infection or inflammation.    Oropharynx: The oral pharynx was free of mass lesion or mucosal abnormality. The palate was noted to be without lesion. The uvula was normal appearing. The tonsils were normal appearing.    Ears: Examination of the ears revealed that the auricles were normally formed with no lesions. The external auditory canals were cleaned of any obstructing cerumen. The tympanic membranes were intact and freely mobile to pneumatoscopy. There are no significant retraction pockets. There is no inflammation visualized. No effusions are seen.    Neck: Visualization and palpation of the neck revealed no mass lesions, no thyromegaly or thyroid masses. No skin lesions or inflammatory processes were detected. The cervical musculature was normal to palpation.    Cervical Lymphatics: There were no palpable lymph nodes in the posterior triangle, submandibular triangle, jugulodigastric region, or central neck.    CV: peripheral perfusion intact, no cyanosis, clubbing or edema of extremities.    Respiratory: Normal inspiration and expiration  and chest wall expansion, no use of accessory muscles to breathe, no stridor or stertor.    Procedure Note:  Procedure: Nasal endoscopy - diagnostic  Indication: concern for chronic sinusitis  Informed consent obtained: risks, benefits, alternatives, and expectations discussed with patient and the patient wishes to proceed.    Findings: After topical decongestion with decongestant and anesthetic spray, nasal endoscopy was performed using an endoscope. The septum was deviated gently to the right anteriorly. The inferior turbinates were extremely hypertrophic. The middle turbinates appeared healthy. The middle meatus is with thick mucoid drainage, worse on the left but no purulence, masses, lesions or polyps. The superior meatus and sphenoethmoid recess on the right show a small polyp and drainage, the left side is clear. The nasal passageway is patent. The nasopharynx was clear. There were no complications and the patient tolerated the procedure well.    Scribe Attestation  By signing my name below, I, Cassius Russ, attest that this documentation has been prepared under the direction and in the presence of Colten Sawyer MD. All medical record entries made by the Scribe were at my direction or personally dictated by me. I have reviewed the chart and agree that the record accurately reflects my personal performance of the history, physical exam, discussion and plan.

## 2023-11-22 ENCOUNTER — OFFICE VISIT (OUTPATIENT)
Dept: OTOLARYNGOLOGY | Facility: CLINIC | Age: 68
End: 2023-11-22
Payer: COMMERCIAL

## 2023-11-22 VITALS — WEIGHT: 194.7 LBS | TEMPERATURE: 97 F | BODY MASS INDEX: 28.75 KG/M2

## 2023-11-22 DIAGNOSIS — J33.9 NASAL POLYP: ICD-10-CM

## 2023-11-22 DIAGNOSIS — J32.8 OTHER CHRONIC SINUSITIS: ICD-10-CM

## 2023-11-22 DIAGNOSIS — J34.2 NASAL SEPTAL DEVIATION: ICD-10-CM

## 2023-11-22 DIAGNOSIS — J34.89 NASAL AND SINUS DISCHARGE: ICD-10-CM

## 2023-11-22 DIAGNOSIS — J20.9 ACUTE BRONCHITIS, UNSPECIFIED ORGANISM: ICD-10-CM

## 2023-11-22 DIAGNOSIS — J34.89 NASAL DRAINAGE: ICD-10-CM

## 2023-11-22 DIAGNOSIS — J31.0 CHRONIC RHINITIS: Primary | ICD-10-CM

## 2023-11-22 DIAGNOSIS — J34.3 HYPERTROPHY OF BOTH INFERIOR NASAL TURBINATES: ICD-10-CM

## 2023-11-22 PROCEDURE — 31231 NASAL ENDOSCOPY DX: CPT | Performed by: OTOLARYNGOLOGY

## 2023-11-22 PROCEDURE — 99214 OFFICE O/P EST MOD 30 MIN: CPT | Performed by: OTOLARYNGOLOGY

## 2023-11-22 PROCEDURE — 1126F AMNT PAIN NOTED NONE PRSNT: CPT | Performed by: OTOLARYNGOLOGY

## 2023-11-22 PROCEDURE — 1159F MED LIST DOCD IN RCRD: CPT | Performed by: OTOLARYNGOLOGY

## 2023-11-22 PROCEDURE — 1160F RVW MEDS BY RX/DR IN RCRD: CPT | Performed by: OTOLARYNGOLOGY

## 2023-11-22 RX ORDER — AZELASTINE 1 MG/ML
1 SPRAY, METERED NASAL 2 TIMES DAILY
Qty: 30 ML | Refills: 3 | Status: SHIPPED | OUTPATIENT
Start: 2023-11-22 | End: 2024-03-27 | Stop reason: SDUPTHER

## 2023-11-22 ASSESSMENT — PATIENT HEALTH QUESTIONNAIRE - PHQ9: 2. FEELING DOWN, DEPRESSED OR HOPELESS: NOT AT ALL

## 2023-11-22 NOTE — LETTER
November 22, 2023     Didier Pennington MD  [address] [facility] [address]    Patient: Sven Gardiner   YOB: 1955   Date of Visit: 11/22/2023       Dear Dr. Didier Pennington MD:    Thank you for referring Sven Gardiner to me for evaluation. Below are my notes for this consultation.  If you have questions, please do not hesitate to call me. I look forward to following your patient along with you.       Sincerely,     Colten Sawyer MD      CC: No Recipients  ______________________________________________________________________________________    HPI  Sven Gardiner is a 68 y.o. male, referred by Didier Pennington MD. The patient presents with concerns of sinus and nose problems that began several years ago. He reports that antibiotics improve sinus symptoms. However, he still has chronic symptoms all year round including #1 nasal drainage, and #2 bilateral nasal obstruction worse on the right. He also reports concern of a sensation of something in his throat. Patient denies facial pressure, facial pain, periorbital edema, epiphora, loss of taste, loss of smell, and epistaxis. The patient has taken Flonase 2 sprays in each nostril once daily for 6-7 months. He has been taking omeprazole 1 tablet daily. Flonase provides some relief. The patient has tried nasal saline irrigations via Navage. This provides relief for a couple hours. Does have a history of allergic rhinitis or allergies. He denies a history of aspirin sensitivity. He reports 2-3 sinus infections per year requiring antibiotic treatment.    The patient reports a history of nasal surgery in approximately 1993 for a broken nose.  Social: Smokes 2 cigarettes per week.    Past Medical History:   Diagnosis Date   • Acute maxillary sinusitis, unspecified 05/13/2016    Acute maxillary sinusitis, unspecified   • Cough, unspecified 08/31/2016    Cough   • Encounter for screening, unspecified 03/09/2016    Screening   • Personal history of other  specified conditions 10/09/2015    History of abdominal pain       Past Surgical History:   Procedure Laterality Date   • OTHER SURGICAL HISTORY  2022    Appendectomy       Social History     Socioeconomic History   • Marital status:      Spouse name: Berenice   • Number of children: 2   • Years of education: Not on file   • Highest education level: Not on file   Occupational History   • Occupation: Computers   Tobacco Use   • Smoking status: Former     Packs/day: .25     Types: Cigarettes     Start date:      Quit date:      Years since quittin.8     Passive exposure: Never   • Smokeless tobacco: Never   Vaping Use   • Vaping Use: Never used   Substance and Sexual Activity   • Alcohol use: Yes     Comment: socially   • Drug use: Never   • Sexual activity: Yes     Partners: Female   Other Topics Concern   • Not on file   Social History Narrative   • Not on file     Social Determinants of Health     Financial Resource Strain: Not on file   Food Insecurity: Not on file   Transportation Needs: Not on file   Physical Activity: Not on file   Stress: Not on file   Social Connections: Not on file   Intimate Partner Violence: Not on file   Housing Stability: Not on file       Family History   Problem Relation Name Age of Onset   • Heart attack Mother     • Diabetes Mother     • Ulcers Father         No Known Allergies    Medication Documentation Review Audit       Reviewed by Jeanne Jasmine MA (Medical Assistant) on 23 at 1525      Medication Order Taking? Sig Documenting Provider Last Dose Status   amLODIPine (Norvasc) 5 mg tablet [id number] No Take 1 tablet (5 mg) by mouth once daily. Historical Provider, MD Taking Active   bumetanide (Bumex) 1 mg tablet [id number] No Take 1 tablet (1 mg) by mouth once daily. Historical Provider, MD Not Taking Active   finasteride (Proscar) 5 mg tablet [id number]  TAKE 1 TABLET (5 MG) BY MOUTH ONCE DAILY. DO NOT CRUSH, CHEW, OR SPLIT. Didier Pennington MD  Active    fluticasone (Flonase) 50 mcg/actuation nasal spray [id number]  ADMINISTER 1 SPRAY INTO EACH NOSTRIL IN THE MORNING AND 1 SPRAY BEFORE BEDTIME. SHAKE GENTLY. BEFORE FIRST USE, PRIME PUMP. AFTER USE, CLEAN TIP AND REPLACE CAP.. Didier Pennington MD  Active   lovastatin (Mevacor) 20 mg tablet [id number] No Take 1 tablet (20 mg) by mouth once daily. Historical Provider, MD Taking Active   mirtazapine (Remeron) 7.5 mg tablet [id number] No Take by mouth. Historical Provider, MD Not Taking Active   omeprazole (PriLOSEC) 40 mg DR capsule [id number]  Take 1 capsule (40 mg) by mouth once daily. Didier Pennington MD  Active   polyethylene glycol (Glycolax, Miralax) 17 gram/dose powder [id number]  DISSOLVE 17 GRAMS IN 8 OZ OF FLUID LIQUID DRINK DAILY AS DIRECTED Didier Pennington MD  Active   potassium chloride CR 10 mEq ER tablet [id number] No Take 1 tablet (10 mEq) by mouth once daily. Historical Provider, MD Not Taking Active   rosuvastatin (Crestor) 20 mg tablet [id number] No Take 1 tablet (20 mg) by mouth once daily. Historical Provider, MD Not Taking Active   tamsulosin (Flomax) 0.4 mg 24 hr capsule [id number]  TAKE 1 CAPSULE BY MOUTH EVERYDAY AT BEDTIME Didier Pennington MD  Active                    Review of Systems  Negative for constitutional, eyes, cardiac, pulmonary, hepatic, renal, digestive, hematologic, epileptic, syncopal, musculo-skeletal, mental health, integumentary, hypertensive, lipid, arthritic, diabetic, thyroid or neurologic disorders (except as listed in the HPI, PMH and Problem List).    Assessment  68 y.o. 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  11/22/23 - Obstruction/drainage, right sided posterior superior turb polyp, ITH, DNS, concern for CRS. Completed 10 days of Augmentin. CT sinus ordered. Continue Flonase. Rx Azelastine. Continue omeprazole and  use before evening meal.    Plan  I personally reviewed the note from Dr. Pennington's office from 10/19/2023. This is contributing to my history and assessment: The patient was diagnosed with persistent postnasal drip, sinusitis, and bronchitis, and was referred to ENT. He was given Augmentin antibiotics for 10 days at that time.    I personally reviewed the patient's x-ray sinus report. I discussed the results personally with the patient. The following findings were discussed: X-ray Sinus Report: 12/27/2021: Reports normal paranasal sinuses.    I personally reviewed the patient's x-ray sinus report. I discussed the results personally with the patient. The following findings were discussed: X-ray Sinus Report: 10/11/2013: Reports a large retention cyst or polyp in the left maxillary sinus.    Nasal endoscopy. Findings: as noted.    Reassurance and counseling was provided to the patient, and his condition was extensively discussed, including as noted below:  Patient was instructed to obtain a CT scan of the sinuses without contrast.  Continue Flonase nasal spray, 1 spray in each nostril BID. Patient was instructed on the proper technique of aiming towards the lateral wall of the nose on each side.  Rx Azelastine. Patient was prescribed azelastine nasal spray, 2 sprays in each nostril BID. Patient was instructed on the proper technique of aiming towards the lateral wall of the nose on each side.  Continue omeprazole. Patient was instructed to take omeprazole at least 30 minutes before your evening meal.  Follow up in 4-6 weeks.    Referring Provider: Didier Pennington MD  I will provide a report to the referring provider via the electronic medical record or US mail.    Colten Sawyer MD [facility]  Division of Rhinology, Sinus, and Skull Base Surgery       ENT Physical Exam  General: This is a healthy appearing male who appears his stated age. The patient is alert and appropriately verbally conversant without hoarseness.    Face:  The face was inspected and no cutaneous masses or lesions were visualized. There was no erythema or edema noted. Facial movement was symmetric without weakness. No skin lesions were detected. There was no sinus tenderness elicited. The parotid and submandibular glands were normal to palpation.    Eyes: Extra-ocular muscle function was intact. No nystagmus was observed. Pupils were equal.    Cranial Nerves: Cranial nerves II, III, IV, and VI were noted to be intact via extra-ocular muscle movement testing. Cranial nerve VII noted to be intact and symmetric by facial movement. Cranial nerve VIII was tested with whispered voice examination and revealed symmetric hearing. Cranial nerves IX and X noted to be intact by gag reflex and palatal movement. Cranial nerve XII noted to be intact by active and symmetric tongue movement.    Nose: Examination of the nose revealed the nasal dorsum to be midline. Intranasal exam reveals the septum was deviated gently to the right anteriorly. The inferior turbinates were extremely hypertrophic. No masses, polyps, mucopus, or other lesion on anterior rhinoscopy. See below procedure note as applicable for further exam.    Oral Cavity: Examination of the oral cavity revealed no mass lesions nor infection. The palate was noted to be intact without evidence of clefting. The tongue exhibited normal mobility. Mucosa was moist without lesion. The lips were free of lesion. Gums were free of inflammation. Dentition: normal without obvious infection or inflammation.    Oropharynx: The oral pharynx was free of mass lesion or mucosal abnormality. The palate was noted to be without lesion. The uvula was normal appearing. The tonsils were normal appearing.    Ears: Examination of the ears revealed that the auricles were normally formed with no lesions. The external auditory canals were cleaned of any obstructing cerumen. The tympanic membranes were intact and freely mobile to pneumatoscopy. There are  no significant retraction pockets. There is no inflammation visualized. No effusions are seen.    Neck: Visualization and palpation of the neck revealed no mass lesions, no thyromegaly or thyroid masses. No skin lesions or inflammatory processes were detected. The cervical musculature was normal to palpation.    Cervical Lymphatics: There were no palpable lymph nodes in the posterior triangle, submandibular triangle, jugulodigastric region, or central neck.    CV: peripheral perfusion intact, no cyanosis, clubbing or edema of extremities.    Respiratory: Normal inspiration and expiration and chest wall expansion, no use of accessory muscles to breathe, no stridor or stertor.    Procedure Note:  Procedure: Nasal endoscopy - diagnostic  Indication: concern for chronic sinusitis  Informed consent obtained: risks, benefits, alternatives, and expectations discussed with patient and the patient wishes to proceed.    Findings: After topical decongestion with decongestant and anesthetic spray, nasal endoscopy was performed using an endoscope. The septum was deviated gently to the right anteriorly. The inferior turbinates were extremely hypertrophic. The middle turbinates appeared healthy. The middle meatus is with thick mucoid drainage, worse on the left but no purulence, masses, lesions or polyps. The superior meatus and sphenoethmoid recess on the right show a small polyp and drainage, the left side is clear. The nasal passageway is patent. The nasopharynx was clear. There were no complications and the patient tolerated the procedure well.    [place] Attestation  By signing my name below, I, Cassius Russ, attest that this documentation has been prepared under the direction and in the presence of Colten Sawyer MD. All medical record entries made by the Scribe were at my direction or personally dictated by me. I have reviewed the chart and agree that the record accurately reflects my personal performance of the  history, physical exam, discussion and plan.

## 2023-11-22 NOTE — LETTER
November 22, 2023     Didier Pennington MD  [address] [facility] [address]    Patient: Sven Gardiner   YOB: 1955   Date of Visit: 11/22/2023       Dear Dr. Didier Pennington MD:    Thank you for referring Sven Gardiner to me for evaluation. Below are my notes for this consultation.  If you have questions, please do not hesitate to call me. I look forward to following your patient along with you.       Sincerely,     Colten Sawyer MD      CC: No Recipients  ______________________________________________________________________________________    HPI  Sven Gardiner is a 68 y.o. male, referred by Didier Pennington MD. The patient presents with concerns of sinus and nose problems that began several years ago. He reports that antibiotics improve sinus symptoms. However, he still has chronic symptoms all year round including #1 nasal drainage, and #2 bilateral nasal obstruction worse on the right. He also reports concern of a sensation of something in his throat. Patient denies facial pressure, facial pain, periorbital edema, epiphora, loss of taste, loss of smell, and epistaxis. The patient has taken Flonase 2 sprays in each nostril once daily for 6-7 months. He has been taking omeprazole 1 tablet daily. Flonase provides some relief. The patient has tried nasal saline irrigations via Navage. This provides relief for a couple hours. Does have a history of allergic rhinitis or allergies. He denies a history of aspirin sensitivity. He reports 2-3 sinus infections per year requiring antibiotic treatment.    The patient reports a history of nasal surgery in approximately 1993 for a broken nose.  Social: Smokes 2 cigarettes per week.    Past Medical History:   Diagnosis Date   • Acute maxillary sinusitis, unspecified 05/13/2016    Acute maxillary sinusitis, unspecified   • Cough, unspecified 08/31/2016    Cough   • Encounter for screening, unspecified 03/09/2016    Screening   • Personal history of other  specified conditions 10/09/2015    History of abdominal pain       Past Surgical History:   Procedure Laterality Date   • OTHER SURGICAL HISTORY  2022    Appendectomy       Social History     Socioeconomic History   • Marital status:      Spouse name: Berenice   • Number of children: 2   • Years of education: Not on file   • Highest education level: Not on file   Occupational History   • Occupation: Computers   Tobacco Use   • Smoking status: Former     Packs/day: .25     Types: Cigarettes     Start date:      Quit date:      Years since quittin.8     Passive exposure: Never   • Smokeless tobacco: Never   Vaping Use   • Vaping Use: Never used   Substance and Sexual Activity   • Alcohol use: Yes     Comment: socially   • Drug use: Never   • Sexual activity: Yes     Partners: Female   Other Topics Concern   • Not on file   Social History Narrative   • Not on file     Social Determinants of Health     Financial Resource Strain: Not on file   Food Insecurity: Not on file   Transportation Needs: Not on file   Physical Activity: Not on file   Stress: Not on file   Social Connections: Not on file   Intimate Partner Violence: Not on file   Housing Stability: Not on file       Family History   Problem Relation Name Age of Onset   • Heart attack Mother     • Diabetes Mother     • Ulcers Father         No Known Allergies    Medication Documentation Review Audit       Reviewed by Jeanne Jasmine MA (Medical Assistant) on 23 at 1525      Medication Order Taking? Sig Documenting Provider Last Dose Status   amLODIPine (Norvasc) 5 mg tablet [id number] No Take 1 tablet (5 mg) by mouth once daily. Historical Provider, MD Taking Active   bumetanide (Bumex) 1 mg tablet [id number] No Take 1 tablet (1 mg) by mouth once daily. Historical Provider, MD Not Taking Active   finasteride (Proscar) 5 mg tablet [id number]  TAKE 1 TABLET (5 MG) BY MOUTH ONCE DAILY. DO NOT CRUSH, CHEW, OR SPLIT. Didier Pennington MD  Active    fluticasone (Flonase) 50 mcg/actuation nasal spray [id number]  ADMINISTER 1 SPRAY INTO EACH NOSTRIL IN THE MORNING AND 1 SPRAY BEFORE BEDTIME. SHAKE GENTLY. BEFORE FIRST USE, PRIME PUMP. AFTER USE, CLEAN TIP AND REPLACE CAP.. Didier Pennington MD  Active   lovastatin (Mevacor) 20 mg tablet [id number] No Take 1 tablet (20 mg) by mouth once daily. Historical Provider, MD Taking Active   mirtazapine (Remeron) 7.5 mg tablet [id number] No Take by mouth. Historical Provider, MD Not Taking Active   omeprazole (PriLOSEC) 40 mg DR capsule [id number]  Take 1 capsule (40 mg) by mouth once daily. Didier Pennington MD  Active   polyethylene glycol (Glycolax, Miralax) 17 gram/dose powder [id number]  DISSOLVE 17 GRAMS IN 8 OZ OF FLUID LIQUID DRINK DAILY AS DIRECTED Didier Pennington MD  Active   potassium chloride CR 10 mEq ER tablet [id number] No Take 1 tablet (10 mEq) by mouth once daily. Historical Provider, MD Not Taking Active   rosuvastatin (Crestor) 20 mg tablet [id number] No Take 1 tablet (20 mg) by mouth once daily. Historical Provider, MD Not Taking Active   tamsulosin (Flomax) 0.4 mg 24 hr capsule [id number]  TAKE 1 CAPSULE BY MOUTH EVERYDAY AT BEDTIME Didier Pennington MD  Active                    Review of Systems  Negative for constitutional, eyes, cardiac, pulmonary, hepatic, renal, digestive, hematologic, epileptic, syncopal, musculo-skeletal, mental health, integumentary, hypertensive, lipid, arthritic, diabetic, thyroid or neurologic disorders (except as listed in the HPI, PMH and Problem List).    Assessment  68 y.o. 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  11/22/23 - Obstruction/drainage, right sided posterior superior turb polyp, ITH, DNS, concern for CRS. Completed 10 days of Augmentin. CT sinus ordered. Continue Flonase. Rx Azelastine. Continue omeprazole and  use before evening meal.    Plan  I personally reviewed the note from Dr. Pennington's office from 10/19/2023. This is contributing to my history and assessment: The patient was diagnosed with persistent postnasal drip, sinusitis, and bronchitis, and was referred to ENT. He was given Augmentin antibiotics for 10 days at that time.    I personally reviewed the patient's x-ray sinus report. I discussed the results personally with the patient. The following findings were discussed: X-ray Sinus Report: 12/27/2021: Reports normal paranasal sinuses.    I personally reviewed the patient's x-ray sinus report. I discussed the results personally with the patient. The following findings were discussed: X-ray Sinus Report: 10/11/2013: Reports a large retention cyst or polyp in the left maxillary sinus.    Nasal endoscopy. Findings: as noted.    Reassurance and counseling was provided to the patient, and his condition was extensively discussed, including as noted below:  Patient was instructed to obtain a CT scan of the sinuses without contrast.  Continue Flonase nasal spray, 1 spray in each nostril BID. Patient was instructed on the proper technique of aiming towards the lateral wall of the nose on each side.  Rx Azelastine. Patient was prescribed azelastine nasal spray, 2 sprays in each nostril BID. Patient was instructed on the proper technique of aiming towards the lateral wall of the nose on each side.  Continue omeprazole. Patient was instructed to take omeprazole at least 30 minutes before your evening meal.  Follow up in 4-6 weeks.    Referring Provider: Didier Pennington MD  I will provide a report to the referring provider via the electronic medical record or US mail.    Colten Sawyer MD [facility]  Division of Rhinology, Sinus, and Skull Base Surgery       ENT Physical Exam  General: This is a healthy appearing male who appears his stated age. The patient is alert and appropriately verbally conversant without hoarseness.    Face:  The face was inspected and no cutaneous masses or lesions were visualized. There was no erythema or edema noted. Facial movement was symmetric without weakness. No skin lesions were detected. There was no sinus tenderness elicited. The parotid and submandibular glands were normal to palpation.    Eyes: Extra-ocular muscle function was intact. No nystagmus was observed. Pupils were equal.    Cranial Nerves: Cranial nerves II, III, IV, and VI were noted to be intact via extra-ocular muscle movement testing. Cranial nerve VII noted to be intact and symmetric by facial movement. Cranial nerve VIII was tested with whispered voice examination and revealed symmetric hearing. Cranial nerves IX and X noted to be intact by gag reflex and palatal movement. Cranial nerve XII noted to be intact by active and symmetric tongue movement.    Nose: Examination of the nose revealed the nasal dorsum to be midline. Intranasal exam reveals the septum was deviated gently to the right anteriorly. The inferior turbinates were extremely hypertrophic. No masses, polyps, mucopus, or other lesion on anterior rhinoscopy. See below procedure note as applicable for further exam.    Oral Cavity: Examination of the oral cavity revealed no mass lesions nor infection. The palate was noted to be intact without evidence of clefting. The tongue exhibited normal mobility. Mucosa was moist without lesion. The lips were free of lesion. Gums were free of inflammation. Dentition: normal without obvious infection or inflammation.    Oropharynx: The oral pharynx was free of mass lesion or mucosal abnormality. The palate was noted to be without lesion. The uvula was normal appearing. The tonsils were normal appearing.    Ears: Examination of the ears revealed that the auricles were normally formed with no lesions. The external auditory canals were cleaned of any obstructing cerumen. The tympanic membranes were intact and freely mobile to pneumatoscopy. There are  no significant retraction pockets. There is no inflammation visualized. No effusions are seen.    Neck: Visualization and palpation of the neck revealed no mass lesions, no thyromegaly or thyroid masses. No skin lesions or inflammatory processes were detected. The cervical musculature was normal to palpation.    Cervical Lymphatics: There were no palpable lymph nodes in the posterior triangle, submandibular triangle, jugulodigastric region, or central neck.    CV: peripheral perfusion intact, no cyanosis, clubbing or edema of extremities.    Respiratory: Normal inspiration and expiration and chest wall expansion, no use of accessory muscles to breathe, no stridor or stertor.    Procedure Note:  Procedure: Nasal endoscopy - diagnostic  Indication: concern for chronic sinusitis  Informed consent obtained: risks, benefits, alternatives, and expectations discussed with patient and the patient wishes to proceed.    Findings: After topical decongestion with decongestant and anesthetic spray, nasal endoscopy was performed using an endoscope. The septum was deviated gently to the right anteriorly. The inferior turbinates were extremely hypertrophic. The middle turbinates appeared healthy. The middle meatus is with thick mucoid drainage, worse on the left but no purulence, masses, lesions or polyps. The superior meatus and sphenoethmoid recess on the right show a small polyp and drainage, the left side is clear. The nasal passageway is patent. The nasopharynx was clear. There were no complications and the patient tolerated the procedure well.    Michaelibe Attestation  By signing my name below, I, Cassius Russ, attest that this documentation has been prepared under the direction and in the presence of Colten Sawyer MD. All medical record entries made by the Scribe were at my direction or personally dictated by me. I have reviewed the chart and agree that the record accurately reflects my personal performance of the  history, physical exam, discussion and plan.

## 2023-11-27 DIAGNOSIS — E78.5 DYSLIPIDEMIA: Primary | ICD-10-CM

## 2023-11-27 RX ORDER — LOVASTATIN 20 MG/1
20 TABLET ORAL DAILY
Qty: 30 TABLET | Refills: 3 | Status: SHIPPED | OUTPATIENT
Start: 2023-11-27 | End: 2024-01-22 | Stop reason: ALTCHOICE

## 2023-11-29 ENCOUNTER — OFFICE VISIT (OUTPATIENT)
Dept: ORTHOPEDIC SURGERY | Facility: CLINIC | Age: 68
End: 2023-11-29
Payer: COMMERCIAL

## 2023-11-29 VITALS — WEIGHT: 194 LBS | BODY MASS INDEX: 31.18 KG/M2 | HEIGHT: 66 IN

## 2023-11-29 DIAGNOSIS — M54.16 LUMBAR RADICULOPATHY: Primary | ICD-10-CM

## 2023-11-29 PROCEDURE — 1126F AMNT PAIN NOTED NONE PRSNT: CPT | Performed by: ORTHOPAEDIC SURGERY

## 2023-11-29 PROCEDURE — 99212 OFFICE O/P EST SF 10 MIN: CPT | Performed by: ORTHOPAEDIC SURGERY

## 2023-11-29 PROCEDURE — 1160F RVW MEDS BY RX/DR IN RCRD: CPT | Performed by: ORTHOPAEDIC SURGERY

## 2023-11-29 PROCEDURE — 1159F MED LIST DOCD IN RCRD: CPT | Performed by: ORTHOPAEDIC SURGERY

## 2023-11-29 NOTE — PROGRESS NOTES
Patient returns to review his cervical MRI.  His neck is actually feeling better.    I personally reviewed the MRI of his cervical spine.  This shows degenerative changes without central or foraminal stenosis.    His biggest complaint is his progressively worsening leg pain.  He noted this at his visit with Idalia Handy in July of this year, he did physical therapy in September at [facility] office.  He continues to have pain in his legs with standing and walking.  It is becoming quite bothersome.    He does not have any focal deficits on exam.    I did recommend we check an MRI of his lumbar spine.  He will follow-up with me after the MRI is complete to review the results and further treatment options.    *This note was dictated using speech recognition software and was not corrected for spelling or grammatical errors*

## 2023-12-11 ENCOUNTER — ANCILLARY PROCEDURE (OUTPATIENT)
Dept: RADIOLOGY | Facility: CLINIC | Age: 68
End: 2023-12-11
Payer: COMMERCIAL

## 2023-12-11 DIAGNOSIS — J32.8 OTHER CHRONIC SINUSITIS: ICD-10-CM

## 2023-12-11 PROCEDURE — 70486 CT MAXILLOFACIAL W/O DYE: CPT

## 2023-12-11 PROCEDURE — 70486 CT MAXILLOFACIAL W/O DYE: CPT | Performed by: RADIOLOGY

## 2023-12-14 ENCOUNTER — HOSPITAL ENCOUNTER (OUTPATIENT)
Dept: RADIOLOGY | Facility: HOSPITAL | Age: 68
Discharge: HOME | End: 2023-12-14
Payer: COMMERCIAL

## 2023-12-14 DIAGNOSIS — M54.16 LUMBAR RADICULOPATHY: ICD-10-CM

## 2023-12-14 PROCEDURE — 72148 MRI LUMBAR SPINE W/O DYE: CPT | Performed by: STUDENT IN AN ORGANIZED HEALTH CARE EDUCATION/TRAINING PROGRAM

## 2023-12-14 PROCEDURE — 72148 MRI LUMBAR SPINE W/O DYE: CPT

## 2023-12-16 DIAGNOSIS — J34.89 NASAL AND SINUS DISCHARGE: ICD-10-CM

## 2023-12-19 RX ORDER — AZELASTINE 1 MG/ML
1 SPRAY, METERED NASAL 2 TIMES DAILY
Refills: 2 | OUTPATIENT
Start: 2023-12-19

## 2024-01-03 ENCOUNTER — APPOINTMENT (OUTPATIENT)
Dept: ORTHOPEDIC SURGERY | Facility: CLINIC | Age: 69
End: 2024-01-03
Payer: COMMERCIAL

## 2024-01-10 ENCOUNTER — OFFICE VISIT (OUTPATIENT)
Dept: ORTHOPEDIC SURGERY | Facility: CLINIC | Age: 69
End: 2024-01-10
Payer: COMMERCIAL

## 2024-01-10 VITALS — BODY MASS INDEX: 31.18 KG/M2 | HEIGHT: 66 IN | WEIGHT: 194 LBS

## 2024-01-10 DIAGNOSIS — M54.16 LUMBAR RADICULOPATHY: Primary | ICD-10-CM

## 2024-01-10 PROCEDURE — 1159F MED LIST DOCD IN RCRD: CPT | Performed by: ORTHOPAEDIC SURGERY

## 2024-01-10 PROCEDURE — 1126F AMNT PAIN NOTED NONE PRSNT: CPT | Performed by: ORTHOPAEDIC SURGERY

## 2024-01-10 PROCEDURE — 99213 OFFICE O/P EST LOW 20 MIN: CPT | Performed by: ORTHOPAEDIC SURGERY

## 2024-01-10 ASSESSMENT — PAIN DESCRIPTION - DESCRIPTORS: DESCRIPTORS: ACHING

## 2024-01-10 ASSESSMENT — PAIN SCALES - GENERAL: PAINLEVEL_OUTOF10: 5 - MODERATE PAIN

## 2024-01-10 ASSESSMENT — PAIN - FUNCTIONAL ASSESSMENT: PAIN_FUNCTIONAL_ASSESSMENT: 0-10

## 2024-01-10 NOTE — PROGRESS NOTES
Patient returns to review his lumbar MRI.  He still has miserable left leg radicular pain.    I reviewed the MRI study.  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  There is no extruded fragment or any substantial nerve compression.    I explained to him that he has an irritated nerve root from the annular tear and disc bulge, I think this would benefit greatly from an epidural injection.  I referred him to Dr. Jay for further injections.    *This note was dictated using speech recognition software and was not corrected for spelling or grammatical errors*

## 2024-01-11 NOTE — PROGRESS NOTES
An interactive audio and video telecommunication system which permits real time communications between the patient (at the originating site) and provider (at the distant site) was utilized to provide this telehealth service. Verbal consent was requested and obtained from Sven Gardiner on this date, 01/12/2024 at 12:30 PM EST for a telehealth visit.    HPI   1/12/24 -VV- Patient presents for a followup visit. He reports a 40-50% improvement in symptoms. He endorses continued intermittent right nasal obstruction. He is finding it difficult to sleep and has been using the nasal sprays. He also notes that his throat feels dry. He recently bought a humidifer. His CT sinus results were also discussed today.    Per initial note 11/22/2023:  Sven Gardiner is a 68 y.o. male, referred by  Didier Pennington MD. The patient presents with concerns of sinus and nose problems that began several years ago. He reports that antibiotics improve sinus symptoms. However, he still has chronic symptoms all year round including #1 nasal drainage, and #2 bilateral nasal obstruction worse on the right. He also reports concern of a sensation of something in his throat. Patient denies facial pressure, facial pain, periorbital edema, epiphora, loss of taste, loss of smell, and epistaxis. The patient has taken Flonase 2 sprays in each nostril once daily for 6-7 months. He has been taking omeprazole 1 tablet daily. Flonase provides some relief. The patient has tried nasal saline irrigations via Navage. This provides relief for a couple hours. Does have a history of allergic rhinitis or allergies. He denies a history of aspirin sensitivity. He reports 2-3 sinus infections per year requiring antibiotic treatment.    The patient reports a history of nasal surgery in approximately 1993 for a broken nose.  Social: Smokes 2 cigarettes per week.  Allergies: No Known Allergies    Review of Systems   Negative for constitutional, eyes, cardiac, pulmonary,  hepatic, renal, digestive, hematologic, epileptic, syncopal, musculo-skeletal, mental health, integumentary, hypertensive, lipid, arthritic, diabetic, thyroid or neurologic disorders (except as listed in the HPI, PMH and Problem List).    Assessment   Sven Gardiner is a 68 y.o. 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  11/22/23 - Obstruction/drainage, right sided posterior superior turb polyp, ITH, DNS, concern for CRS. Completed 10 days of Augmentin. CT sinus ordered. Continue Flonase. Rx Azelastine. Continue omeprazole and use before evening meal.  1/12/24 -VV- Pt reports 40-50% improvement in symptoms but still endorses persistent intermittent right nasal obstruction despite using nasal sprays. CT shows moderate sinus disease with bilateral maxillary, ethmoid, and frontal sinus thickening, right sphenoid as well. We discussed surgical vs medical options.     Plan   I personally reviewed the patient's CT scan images and results. I discussed the results personally with the patient. The following findings were discussed: CT Sinus: 12/11/2023: Shows bilateral maxillary, ethmoid, and frontal sinus thickening. There is some mild thickening of the right sphenoid. The left sphenoid is relatively clear. There is some mild obstruction of the left sphenoethmoid recess. The septum is relatively straight but with thickening anteriorly. The left > right inferior turbinates are surgically reduced. Right inferior turbinate with some hypertrophy.    Reassurance and counseling was provided to the patient, and his condition was extensively discussed, including as noted below:    Patient is a good candidate for endoscopic sinus surgery. Patient has failed maximal medical therapy. Sven Gardiner and I discussed his symptoms and clinical findings noted  above in detail. We discussed options including observation, continued medical therapy, or consideration of surgical intervention. Endoscopic sinus surgery itself was discussed at length. The risks, benefits, complications, and alternatives were explained in detail including but not limited to persistence of symptoms, anesthesia, pain, changes in or loss of smell, nasal obstruction, septal perforation, bleeding, infection, need for nasal packing, double vision, vision loss, CSF leak requiring other procedures to repair, numbness of teeth/and or face, need for revision surgery, injury to surrounding tissue, and unexpected risks.  Patient is a candidate for bilateral ESS (total), right inferior turbinate reduction with IGS.  The patient expressed understanding of these risks. An information sheet via the medical record was provided to the patient, which included the rationale for surgery, risks, and expected postoperative care. Patient will contact our office if he would like to proceed with surgery.    Continue Flonase nasal spray, 1 spray in each nostril BID. Patient was previously instructed on the proper technique of aiming towards the lateral wall of the nose on each side.  Continue azelastine nasal spray, 2 sprays in each nostril BID. Patient was previously instructed on the proper technique of aiming towards the lateral wall of the nose on each side.  Continue omeprazole. Patient was instructed to take omeprazole at least 30 minutes before your evening meal.  Patient will contact our office if he would like to proceed with surgery. If the patient wishes to defer surgery, he was instructed to schedule a followup visit in 6 months.    Referring Provider: Didier Pennington MD  I will provide a report to the referring provider via the electronic medical record or US mail.    Colten Sawyer MD [facility]  Division of Rhinology, Sinus, and Skull Base Surgery       Exam   Physical exam was limited because today was a virtual  visit.  Constitutional: Patient appears well developed and well nourished.  General: This is a healthy appearing male who appears his stated age. The patient is alert and appropriately verbally conversant without hoarseness. This patient is in no apparent distress. Not acutely ill or toxic appearing.  Face: The face was inspected, and no cutaneous masses or lesions were visualized. There was no erythema or edema noted. Facial movement was symmetric. No skin lesions were detected.  Eyes: Extra-ocular muscle function was intact. No nystagmus was observed. Pupils were equal.  Cranial Nerves: Cranial nerves II, III, IV, and VI were noted to be intact via extra-ocular muscle movement testing. Cranial nerve VII noted to be intact and symmetric by facial movement.  Nose: Examination of the nose revealed the nasal dorsum to be midline.  Ears: Examination of the ears revealed that the auricles were normally formed with no lesions.  Neck: No skin lesions or inflammatory processes were detected on visual inspection.  Cardiovascular: Peripheral perfusion intact, no evidence of cyanosis, clubbing, or edema.  Respiratory: Normal inspiration and expiration and chest wall expansion, no use of accessory muscles to breathe, no stridor.  Neurological: Mentation is clear. Alert and oriented to time, place, and person. Answering questions appropriately.  Psychiatric: Normal affect and appropriate behavior. Mood is without obvious agitation or disturbance. No evidence of obvious depressive behaviors.       Scribe Attestation  By signing my name below, I, Cassius Russ, attest that this documentation has been prepared under the direction and in the presence of Colten Sawyer MD. All medical record entries made by the Scribe were at my direction or personally dictated by me. I have reviewed the chart and agree that the record accurately reflects my personal performance of the history, physical exam, discussion and plan.

## 2024-01-11 NOTE — PATIENT INSTRUCTIONS
You are a good candidate for endoscopic sinus surgery. After your sinus surgery, you will need to do frequent saline irrigations. This will extremely important so that your sinuses stay open after surgery. Please review the instructions below to prepare for surgery.    Please call our , [facility], at [phone], if you would like to schedule surgery. If you would like to wait on surgery, please schedule a followup with me in 6 months.    Continue using Flonase nasal spray. Do 1 spray in each nostril twice a day. There is less nasal drying when you space out the Flonase spray to one spray in the morning and one spray in the evening. If that is not possible, you can instead do 2 sprays in each nostril once a day. Nasal spray instructions are below.    Continue using azelastine nasal spray. Do 2 sprays in each nostril twice a day. Nasal spray instructions are below.    Nasal spray instructions:  Please take the prescribed nasal sprays (Flonase and azelastine) as directed. BE SURE TO POINT THE SPRAY OUTWARDS TOWARDS THE CORNER OF THE EYE ON THE SAME SIDE NOSTRIL. This will ensure you are treating the appropriate parts of your nose that are swollen or inflamed. Also, avoid sniffing in the spray through your nose as this will cause the spray to go towards the back of your nose and down the back of your throat. Instead, blow out through your mouth while spraying into your nostril. This elevates the soft palate in the back of your mouth which helps the spray to stay in your nose. After spraying, if the fluid starts dripping out of your nose, you can sniff gently to keep the fluid in your nose. It will take take up to 1 month of using Flonase before you notice full benefit. You must use Flonase every day for it to be effective.    Please continue taking omeprazole at least 30 minutes before your evening meal.    PATIENT INFORMATION/INSTRUCTIONS PRIOR TO SINUS SURGERY: *Please Read Carefully*    Dr. Sawyer discussed  the rationale for endoscopic sinus surgery, along with the benefits, risks, potential complications, and side effects of the procedure with you today. If you have any questions about these subjects, please feel free to call our office at [phone].    You can expect after surgery to have increased symptoms and congestion for at least 1 week and sometimes upward of a month. Everyone's body reacts differently. You will have at least 2 visits with Dr. Sawyer for cleaning/debridement of the sinuses in the office after surgery, approximately 1 week and 1 month after surgery. This is necessary to ensure the sinuses heal well. Dr. Sawyer works closely with Rylie Arambula, his nurse practitioner. You will follow up with Dr. Sawyer for the first 3 months after your surgery. After this, Dr. Sawyer and Rylie Arambula will work as a team to take care of you in the months or years after.    As described, your sinus surgery is only half the nick. Post operatively, it is VITAL that you continue the nasal irrigations and other medications directed by Dr. Sawyer. You will be given an instructional sheet post operatively that describes the expected disease course including nasal care. Dr. Sawyer will want you to start irrigations of the nose with saline post operatively. This is also important to keep the nose and sinuses open.    STOP TAKING THESE MEDICATIONS prior to surgery:  Aspirin - 10 to 14 days before surgery  Plavix/clopidogrel - 10 days before surgery  NSAIDs - 7 days before surgery (NSAIDs include painkillers like Motrin, Aleve, Naprosyn, Mobic, diclofenac, and ibuprofen)  Vitamin E - 10 to 14 days before surgery  Multivitamins with Vitamin E - 10 to 14 days before surgery  Herbal Medications/Diet Pills - 10 to 14 days before surgery    5.   Avoid NSAIDs for PAIN RELIEF. If needed, you could take Tylenol on the day of surgery with sips of water. You may also use opiates prescribed by your physician which  includes medications like Percocet / Vicodin / MS Contin / Darvocet / Ultram.    6.   BLOOD THINNERS including Coumadin, Plavix, and Ticlid are to be stopped as directed by surgeon/cardiologist or as listed below.    7.   FOR PATIENTS WITH ASTHMA/COPD:  Use your inhalers as prescribed/as needed on the day of surgery. Bring your inhalers with you to the hospital. If you have Obstructive Sleep Apnea and are on a CPAP/BiPAP machine, please bring it with you to the hospital.    8.   On the day of surgery, DO NOT wear jewelry, body piercings, makeup, nail polish, hairpins, or contacts. Leave all valuables and money with family members. If you have dentures, please leave these with family members.    9.   IF you are undergoing an OUTPATIENT procedure (Ambulatory Surgery - going home on the same day), you must have someone drive you home and stay with you for 24 hours after surgery. You cannot stay alone in a hotel room after outpatient surgery. Also, a  or  cannot be made a responsible caregiver. Your surgery may be cancelled if you do not have someone take care of you for 24 hours.    10.  You will be given a time to arrive the business day before surgery. If you do not hear about a time by 4:30 pm the business day before surgery, please call [phone] and ask for a time.    AFTER SURGERY, please observe the following precautions for 2 weeks from the date of surgery:  Please refrain from blowing your nose. Do not stifle any sneezes. If you must sneeze, try to sneeze through an open mouth. Please do not stick anything in your nose other than any medicine or irrigations we recommend. Please do not insert a Q-tip or finger in the nose because this will cause further trauma. Please refrain from any activities that will increase your heart rate or blood pressure. Try to keep your head above your heart as much as possible. Please refrain from lifting objects greater than 10 lbs. You would also need to  avoid all travel outside your local area for 2 weeks from date of surgery.    Scribe Attestation  By signing my name below, I, Cassius Russ, attest that this documentation has been prepared under the direction and in the presence of Colten Sawyer MD. All medical record entries made by the Scribe were at my direction or personally dictated by me. I have reviewed the chart and agree that the record accurately reflects my personal performance of the history, physical exam, discussion and plan.

## 2024-01-12 ENCOUNTER — TELEMEDICINE (OUTPATIENT)
Dept: OTOLARYNGOLOGY | Facility: CLINIC | Age: 69
End: 2024-01-12
Payer: COMMERCIAL

## 2024-01-12 DIAGNOSIS — J34.3 HYPERTROPHY OF INFERIOR NASAL TURBINATE: ICD-10-CM

## 2024-01-12 DIAGNOSIS — J32.1 CHRONIC FRONTAL SINUSITIS: Primary | ICD-10-CM

## 2024-01-12 DIAGNOSIS — J34.89 NASAL OBSTRUCTION: ICD-10-CM

## 2024-01-12 DIAGNOSIS — J32.2 CHRONIC ETHMOIDAL SINUSITIS: ICD-10-CM

## 2024-01-12 DIAGNOSIS — J32.3 CHRONIC SPHENOIDAL SINUSITIS: ICD-10-CM

## 2024-01-12 DIAGNOSIS — J32.0 CHRONIC MAXILLARY SINUSITIS: ICD-10-CM

## 2024-01-12 DIAGNOSIS — J32.4 CHRONIC PANSINUSITIS: ICD-10-CM

## 2024-01-12 PROCEDURE — 99213 OFFICE O/P EST LOW 20 MIN: CPT | Performed by: OTOLARYNGOLOGY

## 2024-01-12 ASSESSMENT — PATIENT HEALTH QUESTIONNAIRE - PHQ9
2. FEELING DOWN, DEPRESSED OR HOPELESS: NOT AT ALL
1. LITTLE INTEREST OR PLEASURE IN DOING THINGS: NOT AT ALL
SUM OF ALL RESPONSES TO PHQ9 QUESTIONS 1 AND 2: 0

## 2024-01-22 ENCOUNTER — OFFICE VISIT (OUTPATIENT)
Dept: PRIMARY CARE | Facility: CLINIC | Age: 69
End: 2024-01-22
Payer: COMMERCIAL

## 2024-01-22 VITALS — BODY MASS INDEX: 31.82 KG/M2 | HEIGHT: 66 IN | WEIGHT: 198 LBS

## 2024-01-22 DIAGNOSIS — E78.5 DYSLIPIDEMIA: Primary | ICD-10-CM

## 2024-01-22 DIAGNOSIS — H81.13 BENIGN PAROXYSMAL POSITIONAL VERTIGO DUE TO BILATERAL VESTIBULAR DISORDER: ICD-10-CM

## 2024-01-22 DIAGNOSIS — I10 HTN (HYPERTENSION), BENIGN: ICD-10-CM

## 2024-01-22 PROCEDURE — 99214 OFFICE O/P EST MOD 30 MIN: CPT | Performed by: INTERNAL MEDICINE

## 2024-01-22 PROCEDURE — 1160F RVW MEDS BY RX/DR IN RCRD: CPT | Performed by: INTERNAL MEDICINE

## 2024-01-22 PROCEDURE — 1159F MED LIST DOCD IN RCRD: CPT | Performed by: INTERNAL MEDICINE

## 2024-01-22 PROCEDURE — 1126F AMNT PAIN NOTED NONE PRSNT: CPT | Performed by: INTERNAL MEDICINE

## 2024-01-22 RX ORDER — ROSUVASTATIN CALCIUM 20 MG/1
20 TABLET, COATED ORAL DAILY
Qty: 90 TABLET | Refills: 3 | Status: SHIPPED | OUTPATIENT
Start: 2024-01-22 | End: 2025-01-21

## 2024-01-22 RX ORDER — METHYLPREDNISOLONE 4 MG/1
TABLET ORAL
Qty: 21 TABLET | Refills: 1 | Status: SHIPPED | OUTPATIENT
Start: 2024-01-22 | End: 2024-01-29

## 2024-01-22 ASSESSMENT — ENCOUNTER SYMPTOMS
LOSS OF SENSATION IN FEET: 0
DEPRESSION: 0
OCCASIONAL FEELINGS OF UNSTEADINESS: 0

## 2024-01-29 ENCOUNTER — OFFICE VISIT (OUTPATIENT)
Dept: PAIN MEDICINE | Facility: HOSPITAL | Age: 69
End: 2024-01-29
Payer: COMMERCIAL

## 2024-01-29 DIAGNOSIS — M54.16 LUMBAR RADICULOPATHY: ICD-10-CM

## 2024-01-29 PROCEDURE — 1126F AMNT PAIN NOTED NONE PRSNT: CPT | Performed by: ANESTHESIOLOGY

## 2024-01-29 PROCEDURE — 99214 OFFICE O/P EST MOD 30 MIN: CPT | Performed by: ANESTHESIOLOGY

## 2024-01-29 PROCEDURE — 1159F MED LIST DOCD IN RCRD: CPT | Performed by: ANESTHESIOLOGY

## 2024-01-29 PROCEDURE — 99204 OFFICE O/P NEW MOD 45 MIN: CPT | Performed by: ANESTHESIOLOGY

## 2024-01-29 ASSESSMENT — PAIN - FUNCTIONAL ASSESSMENT: PAIN_FUNCTIONAL_ASSESSMENT: 0-10

## 2024-01-29 ASSESSMENT — PAIN SCALES - GENERAL: PAINLEVEL_OUTOF10: 7

## 2024-01-29 NOTE — PROGRESS NOTES
Subjective   Patient ID: Sven Gardiner is a 68 y.o. male with a past medical history of low back pain with radiculopathy referred by Dr. Wakefield    HPI:   Pt reports low back and left leg pain that is throbbing in nature. Pain in the low back is anywhere from 0-3/10 and leg pain can reach as high as 7/10 but this occurs rarely (~1x/month). His leg pain is associated with numbness. It is the worst when he gets up in the morning and improves after several hours. He denies any activities that always make his pain worse but he does notice after a long period of walking the pain is worse in the following days. He first had this pain 15 years ago and he was diagnosed with bulging discs. [facility]. Pain has become more bothersome lately. He has done multiple rounds of physical therapy for his back. The only medication he takes for pain is tylenol.     Lumbar MRI from 12/14/23 shows L3-4 lateral annular tear with mild left foraminal stenosis.    Review of Systems   13-point ROS done and negative except for HPI.     Current Outpatient Medications   Medication Instructions    amLODIPine (NORVASC) 5 mg, oral, Daily    azelastine (Astelin) 137 mcg (0.1 %) nasal spray 1 spray, Each Nostril, 2 times daily, Use 2 sprays in each nostril twice daily as needed for nasal drainage.    bumetanide (Bumex) 1 mg tablet 1 tablet, oral, Daily    finasteride (PROSCAR) 5 mg, oral, Daily, Do not crush, chew, or split.     fluticasone (Flonase) 50 mcg/actuation nasal spray 1 spray, Each Nostril, 2 times daily, Shake gently. Before first use, prime pump. After use, clean tip and replace cap.    methylPREDNISolone (Medrol Dospak) 4 mg tablets Follow schedule on package instructions    mirtazapine (Remeron) 7.5 mg tablet oral    omeprazole (PRILOSEC) 40 mg, oral, Daily    polyethylene glycol (GLYCOLAX, MIRALAX) 17 g, oral, Daily, Dissolve into 8 oz of liquid    potassium chloride CR 10 mEq ER tablet 1 tablet,  oral, Daily    rosuvastatin (CRESTOR) 20 mg, oral, Daily    tamsulosin (FLOMAX) 0.4 mg, oral, Nightly       Past Medical History:   Diagnosis Date    Acute maxillary sinusitis, unspecified 05/13/2016    Acute maxillary sinusitis, unspecified    Cough, unspecified 08/31/2016    Cough    Encounter for screening, unspecified 03/09/2016    Screening    Personal history of other specified conditions 10/09/2015    History of abdominal pain        Past Surgical History:   Procedure Laterality Date    OTHER SURGICAL HISTORY  04/06/2022    Appendectomy        Family History   Problem Relation Name Age of Onset    Heart attack Mother      Diabetes Mother      Ulcers Father          No Known Allergies     Objective     There were no vitals filed for this visit.     Physical Exam  General: NAD, well groomed, well nourished  Eyes: Non-icteric sclera, EOMI  Ears, Nose, Mouth, and Throat: External ears and nose appear to be without deformity or rash. No lesions or masses noted. Hearing is grossly intact.   Neck: Trachea midline  Respiratory: Nonlabored breathing   Cardiovascular: no peripheral edema   Skin: No rashes or open lesions/ulcers identified on skin.  MSK: MARSHALL    Back:   Palpation: No tenderness to palpation over lumbar paraspinous muscles.   Straight leg raise: positive at 45 degrees on the left  SI Joint: Pain with MAURA on the left    Neurologic:   Cranial nerves grossly intact.   Strength 5/5 and symmetric plantar/dorsiflexion   Sensation: Normal to light touch throughout  DTRs:normal and symmetric throughout    [facility]: Alert, orientation to person, place, and time. Cooperative.    Assessment/Plan   Sven Gardiner is a 68 y.o. male presenting with low back pain and left sided radicular symptoms in an L4 distribution. Lumbar MRI from 12/14/23 shows L3-4 lateral annular tear with mild left foraminal stenosis. He takes only tylenol for pain and has done multiple rounds of physical therapy for his pain, most recently  in September 2023. His pain is likely discogenic with a radicular component. We will attempt medical management prior to pursuing injections.     Plan:  - Start duloxetine 30mg daily and increase to 60mg after one week.    - Encouraged core strengthening exercises for discogenic back pain    - We can consider injections in the future (left L3 and L5 transforaminal epidural steroid injections) if pain worsens     The patient was invited to contact us back anytime with any questions or concerns and follow-up with us in the office as needed.     Diagnoses and all orders for this visit:  Lumbar radiculopathy  -     Referral to Pain Medicine

## 2024-02-21 DIAGNOSIS — J30.2 SEASONAL ALLERGIES: ICD-10-CM

## 2024-02-22 RX ORDER — FLUTICASONE PROPIONATE 50 MCG
1 SPRAY, SUSPENSION (ML) NASAL 2 TIMES DAILY
Qty: 48 ML | Refills: 1 | Status: SHIPPED | OUTPATIENT
Start: 2024-02-22 | End: 2025-02-21

## 2024-03-11 NOTE — PROGRESS NOTES
Subjective   Sven Gardiner is a 68 y.o. male who presents for here for blood pressure concern Dizziness.  HPI  Sven is 68 male history of benign essential hypertension gastroesophageal reflux disease enlarged prostate dyslipidemia takes his medication as prescribed patient complaining of explained his blood pressure.  Complaining of fluctuating blood pressure.  No headache, no nausea vomiting, no fever no chills denies palpitation. Dizziness improved stopped Z- quil and azelastine with improved.    Review of Systems  10 system review pertinent as above  Objective     There were no vitals taken for this visit.     Physical Exam    HEENT: Atraumatic normocephalic the pupils are equal and round and reactive to light the sclerae nonicteric extraocular motion are intact.  Neck: Is supple without JVD no carotid bruits the trachea is midline there are no masses pulses are equal and bilateral with normal upstroke.  Skin: Normal.  Skin good texture.  Moist.  Good turgor.  No lesions, no rashes.  Lymph: No lymphadenopathy appreciated, no masses, no lesions  Lungs: Are clear to auscultation and percussion, good breath sounds bilaterally, no rhonchi, no wheezing, good diaphragmatic excursion.  Heart: Normal rate and normal rhythm S1, S2, no S3, no gallop, murmur or rub.  Abdomen: Soft, nontender, no organomegaly, good bowel sounds.    Extremities: Full range of motion, good pulses bilateral.  No cyanosis, no clubbing or edema.  Neuro: Cranial nerves II-XII are grossly intact there is no sensory or motor deficits.  Able to move all extremities.    Ears bilateral, redness both eardrums no discharge    Assessment/Plan     Here for follow-up    GERD  With reflux  On omeprazole   Doing well follows with Dr Kt Orozco     Complaining of dizziness  In the setting of vestibular dizziness  Medrol Dosepak  Patient noted improvent  With stopping Z-Quill and  azelastine  Ok on Flonase     Continue with the low-fat, low-cholesterol  diet,  I recommended Mediterranean diet, which include fish, chicken, vegetables and olive oil  Exercise daily for 30 minutes at least 3 times a week  Continue home medications  Norvasc 5 mg daily.   CACS score is 39.41 11/2022    Hypertension elevated blood pressure 140/88  No added salt diet, do not and salt to your food  Try to exercise every other day for 30 minutes  Continue current medications Norvasc as needed  Patient checks his pressure before he takes it  Take for systolic greater than 150 or diastolic greater than 90    Large prostate  Nocturia X1  Normal PSA  Nocturia 1 or twice at night      Problem List Items Addressed This Visit       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    Dyslipidemia    HTN (hypertension), benign    Hyperthyroidism    Post-nasal drip - Primary    Relevant Medications    guaiFENesin (Mucinex) 600 mg 12 hr tablet     Didier Pennington MD

## 2024-03-14 ENCOUNTER — OFFICE VISIT (OUTPATIENT)
Dept: PRIMARY CARE | Facility: CLINIC | Age: 69
End: 2024-03-14
Payer: COMMERCIAL

## 2024-03-14 VITALS — WEIGHT: 196 LBS | BODY MASS INDEX: 31.5 KG/M2 | HEIGHT: 66 IN

## 2024-03-14 DIAGNOSIS — E78.5 DYSLIPIDEMIA: ICD-10-CM

## 2024-03-14 DIAGNOSIS — K22.70 BARRETT'S ESOPHAGUS WITHOUT DYSPLASIA: ICD-10-CM

## 2024-03-14 DIAGNOSIS — I10 HTN (HYPERTENSION), BENIGN: ICD-10-CM

## 2024-03-14 DIAGNOSIS — R09.82 POST-NASAL DRIP: Primary | ICD-10-CM

## 2024-03-14 DIAGNOSIS — E05.90 HYPERTHYROIDISM: ICD-10-CM

## 2024-03-14 PROCEDURE — 99214 OFFICE O/P EST MOD 30 MIN: CPT | Performed by: INTERNAL MEDICINE

## 2024-03-14 PROCEDURE — 1159F MED LIST DOCD IN RCRD: CPT | Performed by: INTERNAL MEDICINE

## 2024-03-14 RX ORDER — GUAIFENESIN 600 MG/1
1200 TABLET, EXTENDED RELEASE ORAL 2 TIMES DAILY
Qty: 120 TABLET | Refills: 11 | Status: SHIPPED | OUTPATIENT
Start: 2024-03-14 | End: 2025-03-14

## 2024-03-14 ASSESSMENT — ENCOUNTER SYMPTOMS
OCCASIONAL FEELINGS OF UNSTEADINESS: 0
DEPRESSION: 0
LOSS OF SENSATION IN FEET: 0

## 2024-03-15 ENCOUNTER — APPOINTMENT (OUTPATIENT)
Dept: PRIMARY CARE | Facility: CLINIC | Age: 69
End: 2024-03-15
Payer: COMMERCIAL

## 2024-03-21 ENCOUNTER — APPOINTMENT (OUTPATIENT)
Dept: GASTROENTEROLOGY | Facility: CLINIC | Age: 69
End: 2024-03-21
Payer: COMMERCIAL

## 2024-03-23 DIAGNOSIS — J34.89 NASAL AND SINUS DISCHARGE: ICD-10-CM

## 2024-03-27 RX ORDER — AZELASTINE 1 MG/ML
1 SPRAY, METERED NASAL 2 TIMES DAILY
Qty: 30 ML | Refills: 1 | Status: SHIPPED | OUTPATIENT
Start: 2024-03-27 | End: 2024-04-25

## 2024-04-11 ENCOUNTER — OFFICE VISIT (OUTPATIENT)
Dept: PRIMARY CARE | Facility: CLINIC | Age: 69
End: 2024-04-11
Payer: COMMERCIAL

## 2024-04-11 VITALS
HEART RATE: 76 BPM | HEIGHT: 66 IN | WEIGHT: 191 LBS | TEMPERATURE: 97.9 F | BODY MASS INDEX: 30.7 KG/M2 | DIASTOLIC BLOOD PRESSURE: 82 MMHG | SYSTOLIC BLOOD PRESSURE: 122 MMHG

## 2024-04-11 DIAGNOSIS — I10 HTN (HYPERTENSION), BENIGN: ICD-10-CM

## 2024-04-11 DIAGNOSIS — K21.00 GASTROESOPHAGEAL REFLUX DISEASE WITH ESOPHAGITIS, UNSPECIFIED WHETHER HEMORRHAGE: ICD-10-CM

## 2024-04-11 DIAGNOSIS — N30.01 ACUTE CYSTITIS WITH HEMATURIA: ICD-10-CM

## 2024-04-11 DIAGNOSIS — K22.70 BARRETT'S ESOPHAGUS WITHOUT DYSPLASIA: ICD-10-CM

## 2024-04-11 DIAGNOSIS — E78.5 DYSLIPIDEMIA: ICD-10-CM

## 2024-04-11 DIAGNOSIS — R30.0 DYSURIA: Primary | ICD-10-CM

## 2024-04-11 LAB
APPEARANCE UR: CLEAR
BILIRUB UR QL STRIP: ABNORMAL
COLOR UR: YELLOW
GLUCOSE UR STRIP-MCNC: NEGATIVE MG/DL
HGB UR QL STRIP: ABNORMAL
KETONES UR STRIP-MCNC: ABNORMAL MG/DL
LEUKOCYTE ESTERASE UR QL STRIP: ABNORMAL
NITRITE UR QL STRIP: NEGATIVE
PH UR STRIP: 5.5 [PH]
PROT UR STRIP-MCNC: ABNORMAL MG/DL
SP GR UR STRIP.AUTO: 1.02
UROBILINOGEN UR STRIP-ACNC: 0.2 E.U./DL

## 2024-04-11 PROCEDURE — 99214 OFFICE O/P EST MOD 30 MIN: CPT | Performed by: INTERNAL MEDICINE

## 2024-04-11 PROCEDURE — 1159F MED LIST DOCD IN RCRD: CPT | Performed by: INTERNAL MEDICINE

## 2024-04-11 PROCEDURE — 81003 URINALYSIS AUTO W/O SCOPE: CPT | Performed by: INTERNAL MEDICINE

## 2024-04-11 PROCEDURE — 3079F DIAST BP 80-89 MM HG: CPT | Performed by: INTERNAL MEDICINE

## 2024-04-11 PROCEDURE — 3074F SYST BP LT 130 MM HG: CPT | Performed by: INTERNAL MEDICINE

## 2024-04-11 RX ORDER — CIPROFLOXACIN 500 MG/1
500 TABLET ORAL 2 TIMES DAILY
Qty: 20 TABLET | Refills: 0 | Status: SHIPPED | OUTPATIENT
Start: 2024-04-11 | End: 2024-04-21

## 2024-04-11 ASSESSMENT — ENCOUNTER SYMPTOMS
DEPRESSION: 0
OCCASIONAL FEELINGS OF UNSTEADINESS: 0
LOSS OF SENSATION IN FEET: 0

## 2024-04-11 NOTE — PROGRESS NOTES
Subjective   Sven Gardiner is a 68 y.o. male who presents for here for blood pressure concern burning with urination sensation .  HPI  Sven is 68 male history of benign essential hypertension gastroesophageal reflux disease enlarged prostate dyslipidemia takes his medication as prescribed patient complaining of burning sensation.  Fever 100.2 with a headache, tested negative for Covid  normal taste and smell. Dysuria with nocturia 5-6 times, with dysuria and urgency.  Patient denies hematuria.     Review of Systems  10 system review pertinent as above  Objective     Visit Vitals  /82   Pulse 76   Temp 36.6 °C (97.9 °F)        Physical Exam    HEENT: Atraumatic normocephalic the pupils are equal and round and reactive to light the sclerae nonicteric extraocular motion are intact.  Neck: Is supple without JVD no carotid bruits the trachea is midline there are no masses pulses are equal and bilateral with normal upstroke.  Skin: Normal.  Skin good texture.  Moist.  Good turgor.  No lesions, no rashes.  Lymph: No lymphadenopathy appreciated, no masses, no lesions  Lungs: Are clear to auscultation and percussion, good breath sounds bilaterally, no rhonchi, no wheezing, good diaphragmatic excursion.  Heart: Normal rate and normal rhythm S1, S2, no S3, no gallop, murmur or rub.  Abdomen: Soft, nontender, no organomegaly, good bowel sounds.    Extremities: Full range of motion, good pulses bilateral.  No cyanosis, no clubbing or edema.  Neuro: Cranial nerves II-XII are grossly intact there is no sensory or motor deficits.  Able to move all extremities.        Assessment/Plan     Pressure reviewed patient's urinalysis  Clear urine yellow negative glucose  Small bili small ketones  Specific gravity 1.020  POC blood small  POC protein +2  POC leukocyte esterase +1    Complaining of burning sensation  Urination  Polyuria no polydipsia  Urinalysis  Positive leukoesterase  Suspect acute cystitis  Possibly  prostatitis  Cipro 500 mg twice daily  Fluids and rest  Call if not better.        GERD  With reflux  On omeprazole   Doing well follows with Dr Kt Orozco   No new complaints    Continue with the low-fat, low-cholesterol diet,  I recommended Mediterranean diet, which include fish, chicken, vegetables and olive oil  Exercise daily for 30 minutes at least 3 times a week  Continue home medications  Norvasc 5 mg daily.   CACS score is 39.41 11/2022  Continue with diet and exercise    Hypertension elevated blood pressure 140/88  No added salt diet, do not and salt to your food  Try to exercise every other day for 30 minutes  Continue current medications Norvasc as needed  Patient checks his pressure before he takes it  Take for systolic greater than 150 or diastolic greater than 90    Large prostate  Nocturia X1  Normal PSA  Nocturia 1 or twice at night      Problem List Items Addressed This Visit    None  Visit Diagnoses       Dysuria    -  Primary    Relevant Orders    POCT UA (Automated) docked device    Acute cystitis with hematuria        Relevant Medications    ciprofloxacin (Cipro) 500 mg tablet    Other Relevant Orders    Urine Culture            Didier Pennington MD

## 2024-04-15 ENCOUNTER — APPOINTMENT (OUTPATIENT)
Dept: PRIMARY CARE | Facility: CLINIC | Age: 69
End: 2024-04-15
Payer: COMMERCIAL

## 2024-04-29 ENCOUNTER — APPOINTMENT (OUTPATIENT)
Dept: PRIMARY CARE | Facility: CLINIC | Age: 69
End: 2024-04-29
Payer: COMMERCIAL

## 2024-05-20 DIAGNOSIS — N40.1 BENIGN PROSTATIC HYPERPLASIA WITH LOWER URINARY TRACT SYMPTOMS, SYMPTOM DETAILS UNSPECIFIED: ICD-10-CM

## 2024-05-20 RX ORDER — FINASTERIDE 5 MG/1
5 TABLET, FILM COATED ORAL DAILY
Qty: 30 TABLET | Refills: 11 | Status: SHIPPED | OUTPATIENT
Start: 2024-05-20 | End: 2025-05-20

## 2024-06-10 NOTE — PROGRESS NOTES
Subjective   Sven Gardiner is a 68 y.o. male who presents for here for blood pressure concern burning with urination sensation .  HPI  Sven is 68 male history of benign essential hypertension gastroesophageal reflux disease enlarged prostate dyslipidemia planing of postnasal drip and a cough for last few days with productive phlegm denies fever chills nausea vomiting no diaphoresis did not check for COVID.  He is feeling much better and appetite is persistent cough with greenish-greenish phlegm but overall feeling well.  Review of Systems  10 system review pertinent as above  Objective     Visit Vitals  /82   Pulse 78   Temp 36.4 °C (97.5 °F)   Resp 16          Physical Exam    HEENT: Atraumatic normocephalic the pupils are equal and round and reactive to light the sclerae nonicteric extraocular motion are intact.  Neck: Is supple without JVD no carotid bruits the trachea is midline there are no masses pulses are equal and bilateral with normal upstroke.  Skin: Normal.  Skin good texture.  Moist.  Good turgor.  No lesions, no rashes.  Lymph: No lymphadenopathy appreciated, no masses, no lesions  Lungs: Are clear to auscultation and percussion, good breath sounds bilaterally, no rhonchi, expiratory wheezing, good diaphragmatic excursion.  Heart: Normal rate and normal rhythm S1, S2, no S3, no gallop, murmur or rub.  Abdomen: Soft, nontender, no organomegaly, good bowel sounds.    Extremities: Full range of motion, good pulses bilateral.  No cyanosis, no clubbing or edema.  Neuro: Cranial nerves II-XII are grossly intact there is no sensory or motor deficits.  Able to move all extremities.        Assessment/Plan     Acute asthmatic bronchitis  Postnasal drip productive cough  Greenish-green phlegm  With expiratory wheeze  Will start on azithromycin  Medrol Dosepak  Call if not better    GERD no new complain  With reflux  On omeprazole   Doing well follows with Dr Kt Orozco   No new complaints    Continue  with the low-fat, low-cholesterol diet,  I recommended Mediterranean diet, which include fish, chicken, vegetables and olive oil  Exercise daily for 30 minutes at least 3 times a week  Continue home medications  Norvasc 5 mg daily.   CACS score is 39.41 11/2022  Continue with diet and exercise    Hypertension elevated blood pressure 140/88  No added salt diet, do not and salt to your food  Try to exercise every other day for 30 minutes  Continue current medications Norvasc as needed  Patient checks his pressure before he takes it  Take for systolic greater than 150 or diastolic greater than 90    History of benign prostatic hypertrophy   Nocturia X1  Normal PSA  Nocturia 1 or twice at night      Problem List Items Addressed This Visit       Dyslipidemia    HTN (hypertension), benign    Acute asthmatic bronchitis ([facility]) - Primary    Relevant Medications    azithromycin (Zithromax) 250 mg tablet    methylPREDNISolone (Medrol Dospak) 4 mg tablets         Didier Pennington MD

## 2024-06-13 ENCOUNTER — APPOINTMENT (OUTPATIENT)
Dept: PRIMARY CARE | Facility: CLINIC | Age: 69
End: 2024-06-13
Payer: COMMERCIAL

## 2024-06-13 VITALS
BODY MASS INDEX: 30.53 KG/M2 | RESPIRATION RATE: 16 BRPM | WEIGHT: 190 LBS | DIASTOLIC BLOOD PRESSURE: 82 MMHG | SYSTOLIC BLOOD PRESSURE: 124 MMHG | TEMPERATURE: 97.5 F | HEART RATE: 78 BPM | HEIGHT: 66 IN

## 2024-06-13 DIAGNOSIS — J45.909 ACUTE ASTHMATIC BRONCHITIS (HHS-HCC): Primary | ICD-10-CM

## 2024-06-13 DIAGNOSIS — I10 HTN (HYPERTENSION), BENIGN: ICD-10-CM

## 2024-06-13 DIAGNOSIS — E78.5 DYSLIPIDEMIA: ICD-10-CM

## 2024-06-13 PROCEDURE — 1126F AMNT PAIN NOTED NONE PRSNT: CPT | Performed by: INTERNAL MEDICINE

## 2024-06-13 PROCEDURE — 1160F RVW MEDS BY RX/DR IN RCRD: CPT | Performed by: INTERNAL MEDICINE

## 2024-06-13 PROCEDURE — 1159F MED LIST DOCD IN RCRD: CPT | Performed by: INTERNAL MEDICINE

## 2024-06-13 PROCEDURE — 3074F SYST BP LT 130 MM HG: CPT | Performed by: INTERNAL MEDICINE

## 2024-06-13 PROCEDURE — 1036F TOBACCO NON-USER: CPT | Performed by: INTERNAL MEDICINE

## 2024-06-13 PROCEDURE — 99214 OFFICE O/P EST MOD 30 MIN: CPT | Performed by: INTERNAL MEDICINE

## 2024-06-13 PROCEDURE — 3079F DIAST BP 80-89 MM HG: CPT | Performed by: INTERNAL MEDICINE

## 2024-06-13 RX ORDER — AZITHROMYCIN 250 MG/1
TABLET, FILM COATED ORAL DAILY
Qty: 6 TABLET | Refills: 0 | Status: SHIPPED | OUTPATIENT
Start: 2024-06-13 | End: 2024-06-18

## 2024-06-13 RX ORDER — METHYLPREDNISOLONE 4 MG/1
TABLET ORAL
Qty: 21 TABLET | Refills: 0 | Status: SHIPPED | OUTPATIENT
Start: 2024-06-13 | End: 2024-06-20

## 2024-06-13 ASSESSMENT — ENCOUNTER SYMPTOMS
OCCASIONAL FEELINGS OF UNSTEADINESS: 0
LOSS OF SENSATION IN FEET: 0
DEPRESSION: 0

## 2024-06-13 ASSESSMENT — PAIN SCALES - GENERAL: PAINLEVEL: 0-NO PAIN

## 2024-06-20 ENCOUNTER — APPOINTMENT (OUTPATIENT)
Dept: OTOLARYNGOLOGY | Facility: CLINIC | Age: 69
End: 2024-06-20
Payer: COMMERCIAL

## 2024-06-21 ENCOUNTER — APPOINTMENT (OUTPATIENT)
Dept: OTOLARYNGOLOGY | Facility: CLINIC | Age: 69
End: 2024-06-21
Payer: COMMERCIAL

## 2024-07-30 ENCOUNTER — OFFICE VISIT (OUTPATIENT)
Dept: PRIMARY CARE | Facility: CLINIC | Age: 69
End: 2024-07-30
Payer: COMMERCIAL

## 2024-07-30 VITALS — WEIGHT: 190 LBS | HEIGHT: 66 IN | BODY MASS INDEX: 30.53 KG/M2

## 2024-07-30 DIAGNOSIS — R35.1 NOCTURIA: ICD-10-CM

## 2024-07-30 DIAGNOSIS — E55.9 VITAMIN D INSUFFICIENCY: ICD-10-CM

## 2024-07-30 DIAGNOSIS — E78.5 DYSLIPIDEMIA: ICD-10-CM

## 2024-07-30 DIAGNOSIS — N40.0 BENIGN PROSTATIC HYPERPLASIA WITHOUT LOWER URINARY TRACT SYMPTOMS: ICD-10-CM

## 2024-07-30 DIAGNOSIS — F51.01 PRIMARY INSOMNIA: ICD-10-CM

## 2024-07-30 DIAGNOSIS — Z72.0 TOBACCO ABUSE: Primary | ICD-10-CM

## 2024-07-30 DIAGNOSIS — N40.1 BENIGN PROSTATIC HYPERPLASIA WITH LOWER URINARY TRACT SYMPTOMS, SYMPTOM DETAILS UNSPECIFIED: ICD-10-CM

## 2024-07-30 DIAGNOSIS — Z00.00 PHYSICAL EXAM: ICD-10-CM

## 2024-07-30 PROCEDURE — 1036F TOBACCO NON-USER: CPT | Performed by: INTERNAL MEDICINE

## 2024-07-30 PROCEDURE — 1159F MED LIST DOCD IN RCRD: CPT | Performed by: INTERNAL MEDICINE

## 2024-07-30 PROCEDURE — 99397 PER PM REEVAL EST PAT 65+ YR: CPT | Performed by: INTERNAL MEDICINE

## 2024-07-30 PROCEDURE — 3008F BODY MASS INDEX DOCD: CPT | Performed by: INTERNAL MEDICINE

## 2024-07-30 RX ORDER — OMEPRAZOLE 40 MG/1
40 CAPSULE, DELAYED RELEASE ORAL DAILY
Qty: 90 CAPSULE | Refills: 3 | Status: SHIPPED | OUTPATIENT
Start: 2024-07-30 | End: 2024-08-01

## 2024-07-30 RX ORDER — NICOTINE 7MG/24HR
1 PATCH, TRANSDERMAL 24 HOURS TRANSDERMAL EVERY 24 HOURS
Qty: 14 PATCH | Refills: 1 | Status: SHIPPED | OUTPATIENT
Start: 2024-07-30 | End: 2024-08-27

## 2024-07-30 RX ORDER — TAMSULOSIN HYDROCHLORIDE 0.4 MG/1
0.4 CAPSULE ORAL NIGHTLY
Qty: 90 CAPSULE | Refills: 1 | Status: SHIPPED | OUTPATIENT
Start: 2024-07-30

## 2024-07-30 ASSESSMENT — ENCOUNTER SYMPTOMS
OCCASIONAL FEELINGS OF UNSTEADINESS: 0
DEPRESSION: 0
LOSS OF SENSATION IN FEET: 0

## 2024-07-30 NOTE — PROGRESS NOTES
Subjective   Sven Gardiner is a 68 y.o. male who presents for here for Physical exam .  HPI  Sven is 68 male history of benign essential hypertension gastroesophageal reflux disease enlarged prostate dyslipidemia here for a physical exam.  Patient takes his medication as prescribed. Patient is active physically active. Patient rides 3X a week, No issues.  No constipation diarrhea, follows with GI Dr Orozco, colonoscopy. 1/11/2022  Review of Systems  10 system review pertinent as above  Objective     There were no vitals taken for this visit.         Physical Exam    HEENT: Atraumatic normocephalic the pupils are equal and round and reactive to light the sclerae nonicteric extraocular motion are intact.  Neck: Is supple without JVD no carotid bruits the trachea is midline there are no masses pulses are equal and bilateral with normal upstroke.  Skin: Normal.  Skin good texture.  Moist.  Good turgor.  No lesions, no rashes.  Lymph: No lymphadenopathy appreciated, no masses, no lesions  Lungs: Are clear to auscultation and percussion, good breath sounds bilaterally, no rhonchi, expiratory wheezing, good diaphragmatic excursion.  Heart: Normal rate and normal rhythm S1, S2, no S3, no gallop, murmur or rub.  Abdomen: Soft, nontender, no organomegaly, good bowel sounds.    Extremities: Full range of motion, good pulses bilateral.  No cyanosis, no clubbing or edema.  Neuro: Cranial nerves II-XII are grossly intact there is no sensory or motor deficits.  Able to move all extremities.        Assessment/Plan     Physical Exam     Fasting Blood Test    CBC BMP LIPID AST ALT PSA    Prevention    Colonoscopy 2021 Dr Orozco next 5 years  CACS LAD 35.6            RCA  3.81  PSA 09/2023 Normal    Immunization  HD flu vaccine  Pneumovax  Needed  but declined  Shingles Declined at this time  [place] 2/2 plus 2       GERD no new complain  With reflux  On omeprazole   Doing well follows with Dr Kt Orozco   No new  complaints    Continue with the low-fat, low-cholesterol diet,  I recommended Mediterranean diet, which include fish, chicken, vegetables and olive oil  Exercise daily for 30 minutes at least 3 times a week  Continue home medications  Norvasc 5 mg daily.   CACS score is 39.41 11/2022  Continue with diet and exercise    Hypertension   No added salt diet, do not and salt to your food  Try to exercise every other day for 30 minutes  Continue current medications Norvasc as needed  Patient checks his pressure before he takes it  Take for systolic greater than 150 or diastolic greater than 90    History of benign prostatic hypertrophy   Nocturia X1  Normal PSA  Nocturia 1 or twice at night    Tobacco abuse  We consulted about smoking secession  Patient will try nicoderm ready to quit  Consultation 3-10 min      Problem List Items Addressed This Visit       BPH (benign prostatic hyperplasia)    Relevant Medications    tamsulosin (Flomax) 0.4 mg 24 hr capsule    omeprazole (PriLOSEC) 40 mg DR capsule    Dyslipidemia    Relevant Orders    Lipid Panel    AST    ALT    Tobacco abuse - Primary    Relevant Medications    nicotine (Nicoderm CQ) 7 mg/24 hr patch    Other Relevant Orders    CBC    Physical exam    Relevant Orders    CBC    Basic Metabolic Panel    Lipid Panel    AST    ALT    PSA    Vitamin D 25-Hydroxy,Total (for eval of Vitamin D levels)    Primary insomnia    Vitamin D insufficiency    Relevant Orders    Vitamin D 25-Hydroxy,Total (for eval of Vitamin D levels)    Nocturia           Didier Pennington MD

## 2024-08-01 DIAGNOSIS — N40.1 BENIGN PROSTATIC HYPERPLASIA WITH LOWER URINARY TRACT SYMPTOMS, SYMPTOM DETAILS UNSPECIFIED: ICD-10-CM

## 2024-08-01 RX ORDER — OMEPRAZOLE 40 MG/1
40 CAPSULE, DELAYED RELEASE ORAL DAILY
Qty: 90 CAPSULE | Refills: 3 | Status: SHIPPED | OUTPATIENT
Start: 2024-08-01

## 2024-08-07 ENCOUNTER — LAB (OUTPATIENT)
Dept: LAB | Facility: LAB | Age: 69
End: 2024-08-07
Payer: COMMERCIAL

## 2024-08-07 DIAGNOSIS — E55.9 VITAMIN D INSUFFICIENCY: ICD-10-CM

## 2024-08-07 DIAGNOSIS — E78.5 DYSLIPIDEMIA: ICD-10-CM

## 2024-08-07 DIAGNOSIS — Z72.0 TOBACCO ABUSE: ICD-10-CM

## 2024-08-07 DIAGNOSIS — Z00.00 PHYSICAL EXAM: ICD-10-CM

## 2024-08-07 LAB
25(OH)D3 SERPL-MCNC: 34 NG/ML (ref 30–100)
ALT SERPL W P-5'-P-CCNC: 28 U/L (ref 10–52)
ANION GAP SERPL CALC-SCNC: 11 MMOL/L (ref 10–20)
AST SERPL W P-5'-P-CCNC: 19 U/L (ref 9–39)
BUN SERPL-MCNC: 14 MG/DL (ref 6–23)
CALCIUM SERPL-MCNC: 9.7 MG/DL (ref 8.6–10.3)
CHLORIDE SERPL-SCNC: 104 MMOL/L (ref 98–107)
CHOLEST SERPL-MCNC: 157 MG/DL (ref 0–199)
CHOLESTEROL/HDL RATIO: 3.1
CO2 SERPL-SCNC: 30 MMOL/L (ref 21–32)
CREAT SERPL-MCNC: 1.07 MG/DL (ref 0.5–1.3)
EGFRCR SERPLBLD CKD-EPI 2021: 77 ML/MIN/1.73M*2
ERYTHROCYTE [DISTWIDTH] IN BLOOD BY AUTOMATED COUNT: 13.5 % (ref 11.5–14.5)
GLUCOSE SERPL-MCNC: 107 MG/DL (ref 74–99)
HCT VFR BLD AUTO: 49.9 % (ref 41–52)
HDLC SERPL-MCNC: 49.9 MG/DL
HGB BLD-MCNC: 16.5 G/DL (ref 13.5–17.5)
LDLC SERPL CALC-MCNC: 91 MG/DL
MCH RBC QN AUTO: 29.3 PG (ref 26–34)
MCHC RBC AUTO-ENTMCNC: 33.1 G/DL (ref 32–36)
MCV RBC AUTO: 89 FL (ref 80–100)
NON HDL CHOLESTEROL: 107 MG/DL (ref 0–149)
NRBC BLD-RTO: 0 /100 WBCS (ref 0–0)
PLATELET # BLD AUTO: 292 X10*3/UL (ref 150–450)
POTASSIUM SERPL-SCNC: 4.6 MMOL/L (ref 3.5–5.3)
PSA SERPL-MCNC: 1.73 NG/ML
RBC # BLD AUTO: 5.63 X10*6/UL (ref 4.5–5.9)
SODIUM SERPL-SCNC: 140 MMOL/L (ref 136–145)
TRIGL SERPL-MCNC: 79 MG/DL (ref 0–149)
VLDL: 16 MG/DL (ref 0–40)
WBC # BLD AUTO: 10.6 X10*3/UL (ref 4.4–11.3)

## 2024-08-07 PROCEDURE — 80061 LIPID PANEL: CPT

## 2024-08-07 PROCEDURE — 82306 VITAMIN D 25 HYDROXY: CPT

## 2024-08-07 PROCEDURE — 80048 BASIC METABOLIC PNL TOTAL CA: CPT

## 2024-08-07 PROCEDURE — 84460 ALANINE AMINO (ALT) (SGPT): CPT

## 2024-08-07 PROCEDURE — 36415 COLL VENOUS BLD VENIPUNCTURE: CPT

## 2024-08-07 PROCEDURE — 84153 ASSAY OF PSA TOTAL: CPT

## 2024-08-07 PROCEDURE — 84450 TRANSFERASE (AST) (SGOT): CPT

## 2024-08-07 PROCEDURE — 85027 COMPLETE CBC AUTOMATED: CPT

## 2024-10-01 ENCOUNTER — TELEMEDICINE (OUTPATIENT)
Dept: PRIMARY CARE | Facility: CLINIC | Age: 69
End: 2024-10-01
Payer: COMMERCIAL

## 2024-10-01 DIAGNOSIS — J01.10 ACUTE NON-RECURRENT FRONTAL SINUSITIS: Primary | ICD-10-CM

## 2024-10-01 DIAGNOSIS — J20.9 ACUTE BRONCHITIS, UNSPECIFIED ORGANISM: ICD-10-CM

## 2024-10-01 PROCEDURE — 99213 OFFICE O/P EST LOW 20 MIN: CPT | Performed by: INTERNAL MEDICINE

## 2024-10-01 RX ORDER — METHYLPREDNISOLONE 4 MG/1
TABLET ORAL
Qty: 21 TABLET | Refills: 0 | Status: SHIPPED | OUTPATIENT
Start: 2024-10-01 | End: 2024-10-08

## 2024-10-01 RX ORDER — AMOXICILLIN AND CLAVULANATE POTASSIUM 500; 125 MG/1; MG/1
500 TABLET, FILM COATED ORAL 3 TIMES DAILY
Qty: 30 TABLET | Refills: 0 | Status: SHIPPED | OUTPATIENT
Start: 2024-10-01 | End: 2024-10-11

## 2024-10-01 ASSESSMENT — ENCOUNTER SYMPTOMS
SORE THROAT: 0
WEIGHT LOSS: 0
HEARTBURN: 0
SWEATS: 0
RHINORRHEA: 0
FEVER: 0
SHORTNESS OF BREATH: 0
MYALGIAS: 0
COUGH: 1
CHILLS: 1
HEADACHES: 0
HEMOPTYSIS: 0
WHEEZING: 0

## 2024-10-01 NOTE — PROGRESS NOTES
Subjective   Sven Gardiner is a 69 y.o. male who presents for cough sob Sinus con .  Cough  This is a new problem. Associated symptoms include chills. Pertinent negatives include no chest pain, ear congestion, ear pain, fever, headaches, heartburn, hemoptysis, myalgias, nasal congestion, postnasal drip, rash, rhinorrhea, sore throat, shortness of breath, sweats, weight loss or wheezing.     Sven is 68 male history of benign essential hypertension gastroesophageal reflux disease enlarged prostate dyslipidemia here for a virtual visit complaining of a cough productive of white clearish and occasionally green mucus denies fever chills body aches denies nausea vomiting, took over-the-counter because without success.  Patient is concerned as his shortness of breath is somewhat worse and his cough is somewhat worse as well is also complaining of post nasal drip or sinus congestion. Review of Systems   Constitutional:  Positive for chills. Negative for fever and weight loss.   HENT:  Negative for ear pain, postnasal drip, rhinorrhea and sore throat.    Respiratory:  Positive for cough. Negative for hemoptysis, shortness of breath and wheezing.    Cardiovascular:  Negative for chest pain.   Gastrointestinal:  Negative for heartburn.   Musculoskeletal:  Negative for myalgias.   Skin:  Negative for rash.   Neurological:  Negative for headaches.     10 system review pertinent as above  Objective   Virtual visit  There were no vitals taken for this visit.         Physical Exam    Virtual Visit    Assessment/Plan     Virtual visit  Acute bronchitis  Noted as sinusitis with post nasal drip  Suspect bacterial bronchitis  Will start Augmentin 500 mg 3 times daily  Some elements of wheezing  Which may suggest asthmatic type bronchitis  Will add Medrol Dosepak  Fluids and rest  Call if not better    Physical Exam     Fasting Blood Test    CBC BMP LIPID AST ALT PSA    Prevention    Colonoscopy 2021 Dr Orozco next 5 years  CACS  LAD 35.6            RCA  3.81  PSA 09/2023 Normal    Immunization  HD flu vaccine  Pneumovax  Needed  but declined  Shingles Declined at this time  Corona 2/2 plus 2       GERD no new complain  With reflux  On omeprazole   Doing well follows with Dr Kt Orozco   No new complaints    Continue with the low-fat, low-cholesterol diet,  I recommended Mediterranean diet, which include fish, chicken, vegetables and olive oil  Exercise daily for 30 minutes at least 3 times a week  Continue home medications  Norvasc 5 mg daily.   CACS score is 39.41 11/2022  Continue with diet and exercise    Hypertension   No added salt diet, do not and salt to your food  Try to exercise every other day for 30 minutes  Continue current medications Norvasc as needed  Patient checks his pressure before he takes it  Take for systolic greater than 150 or diastolic greater than 90    History of benign prostatic hypertrophy   Nocturia X1  Normal PSA  Nocturia 1 or twice at night    Tobacco abuse  We consulted about smoking secession  Patient will try nicoderm ready to quit  Consultation 3-10 min      Problem List Items Addressed This Visit    None  Visit Diagnoses       Acute bronchitis, unspecified organism        Relevant Medications    amoxicillin-pot clavulanate (Augmentin) 500-125 mg tablet    methylPREDNISolone (Medrol Dospak) 4 mg tablets                Didier Pennington MD

## 2024-10-15 ENCOUNTER — APPOINTMENT (OUTPATIENT)
Dept: PRIMARY CARE | Facility: CLINIC | Age: 69
End: 2024-10-15
Payer: COMMERCIAL

## 2024-12-12 NOTE — PROGRESS NOTES
Subjective   Sven Gardiner is a 69 y.o. male who presents for here for back pain post nasal drip lower back pain  .  HPI  Sven is 69 male history of benign essential hypertension gastroesophageal reflux disease enlarged prostate dyslipidemia here for a physical exam.  Patient takes his medication as prescribed. Patient is active physically active.  Patient is physically active without physical or cardiovascular symptoms patient complaining of low back pain to complete patient over-the-counter medications without improvement.  Pain is not the lower back radiating to buttock area  Review of Systems  10 system review pertinent as above  Objective     Visit Vitals  /80   Pulse 78   Temp 36.7 °C (98.1 °F)   Resp 16        Physical Exam    HEENT: Atraumatic normocephalic the pupils are equal and round and reactive to light the sclerae nonicteric extraocular motion are intact.  Neck: Is supple without JVD no carotid bruits the trachea is midline there are no masses pulses are equal and bilateral with normal upstroke.  Skin: Normal.  Skin good texture.  Moist.  Good turgor.  No lesions, no rashes.  Lymph: No lymphadenopathy appreciated, no masses, no lesions  Lungs: Are clear to auscultation and percussion, good breath sounds bilaterally, no rhonchi, expiratory wheezing, good diaphragmatic excursion.  Heart: Normal rate and normal rhythm S1, S2, no S3, no gallop, murmur or rub.  Abdomen: Soft, nontender, no organomegaly, good bowel sounds.    Extremities: Full range of motion, good pulses bilateral.  No cyanosis, no clubbing or edema.  Neuro: Cranial nerves II-XII are grossly intact there is no sensory or motor deficits.  Able to move all extremities.    Lower back  Spine decreased flexion decreased extension decreased right lateral plantarflexion    Assessment/Plan     Lower back pain  Core exercise    Post nasal drip and a couh  Will try flonase      Here for lower back pain  Suspect sciatica    Fasting Blood  Test    CBC BMP LIPID AST ALT PSA    Prevention    Colonoscopy 2021 Dr Orozco next 5 years  CACS LAD 35.6            RCA  3.81  PSA 09/2023 Normal    Immunization  HD flu vaccine  Pneumovax  Needed  but declined  Shingles Declined at this time  Corona 2/2 plus 2       GERD no new complain  With reflux  On omeprazole   Doing well follows with Dr Kt Orozco   No new complaints    Continue with the low-fat, low-cholesterol diet,  I recommended Mediterranean diet, which include fish, chicken, vegetables and olive oil  Exercise daily for 30 minutes at least 3 times a week  Continue home medications  Norvasc 5 mg daily.   CACS score is 39.41 11/2022  Continue with diet and exercise    Hypertension   No added salt diet, do not and salt to your food  Try to exercise every other day for 30 minutes  Continue current medications Norvasc as needed  Patient checks his pressure before he takes it  Take for systolic greater than 150 or diastolic greater than 90    History of benign prostatic hypertrophy   Nocturia X1  Normal PSA  Nocturia 1 or twice at night    Tobacco abuse  We consulted about smoking secession  Patient will try nicoderm ready to quit  Consultation 3-10 min      Problem List Items Addressed This Visit       Dyslipidemia    Relevant Medications    rosuvastatin (Crestor) 20 mg tablet     Other Visit Diagnoses       Mucus pooling in larynx    -  Primary    Relevant Medications    guaiFENesin (Mucinex) 600 mg 12 hr tablet                  Didier Pennington MD

## 2024-12-16 ENCOUNTER — APPOINTMENT (OUTPATIENT)
Dept: PRIMARY CARE | Facility: CLINIC | Age: 69
End: 2024-12-16
Payer: COMMERCIAL

## 2024-12-16 VITALS
BODY MASS INDEX: 31.34 KG/M2 | TEMPERATURE: 98.1 F | HEART RATE: 78 BPM | WEIGHT: 195 LBS | HEIGHT: 66 IN | DIASTOLIC BLOOD PRESSURE: 80 MMHG | RESPIRATION RATE: 16 BRPM | SYSTOLIC BLOOD PRESSURE: 124 MMHG

## 2024-12-16 DIAGNOSIS — J38.7 MUCUS POOLING IN LARYNX: Primary | ICD-10-CM

## 2024-12-16 DIAGNOSIS — E78.5 DYSLIPIDEMIA: ICD-10-CM

## 2024-12-16 PROCEDURE — 1123F ACP DISCUSS/DSCN MKR DOCD: CPT | Performed by: INTERNAL MEDICINE

## 2024-12-16 PROCEDURE — 99214 OFFICE O/P EST MOD 30 MIN: CPT | Performed by: INTERNAL MEDICINE

## 2024-12-16 PROCEDURE — 3079F DIAST BP 80-89 MM HG: CPT | Performed by: INTERNAL MEDICINE

## 2024-12-16 PROCEDURE — 1160F RVW MEDS BY RX/DR IN RCRD: CPT | Performed by: INTERNAL MEDICINE

## 2024-12-16 PROCEDURE — 1158F ADVNC CARE PLAN TLK DOCD: CPT | Performed by: INTERNAL MEDICINE

## 2024-12-16 PROCEDURE — 3008F BODY MASS INDEX DOCD: CPT | Performed by: INTERNAL MEDICINE

## 2024-12-16 PROCEDURE — 3074F SYST BP LT 130 MM HG: CPT | Performed by: INTERNAL MEDICINE

## 2024-12-16 PROCEDURE — 1126F AMNT PAIN NOTED NONE PRSNT: CPT | Performed by: INTERNAL MEDICINE

## 2024-12-16 PROCEDURE — 1159F MED LIST DOCD IN RCRD: CPT | Performed by: INTERNAL MEDICINE

## 2024-12-16 RX ORDER — ROSUVASTATIN CALCIUM 20 MG/1
20 TABLET, COATED ORAL DAILY
Qty: 90 TABLET | Refills: 3 | Status: SHIPPED | OUTPATIENT
Start: 2024-12-16 | End: 2025-12-16

## 2024-12-16 RX ORDER — GUAIFENESIN 600 MG/1
1200 TABLET, EXTENDED RELEASE ORAL 2 TIMES DAILY
Qty: 120 TABLET | Refills: 11 | Status: SHIPPED | OUTPATIENT
Start: 2024-12-16 | End: 2025-12-16

## 2024-12-16 ASSESSMENT — ENCOUNTER SYMPTOMS
OCCASIONAL FEELINGS OF UNSTEADINESS: 0
DEPRESSION: 0
LOSS OF SENSATION IN FEET: 0

## 2024-12-16 ASSESSMENT — PAIN SCALES - GENERAL: PAINLEVEL_OUTOF10: 0-NO PAIN

## 2025-03-17 ENCOUNTER — APPOINTMENT (OUTPATIENT)
Dept: PRIMARY CARE | Facility: CLINIC | Age: 70
End: 2025-03-17

## 2025-03-17 DIAGNOSIS — R05.1 ACUTE COUGH: ICD-10-CM

## 2025-03-17 DIAGNOSIS — J01.10 ACUTE NON-RECURRENT FRONTAL SINUSITIS: ICD-10-CM

## 2025-03-17 DIAGNOSIS — J20.9 ACUTE BRONCHITIS, UNSPECIFIED ORGANISM: Primary | ICD-10-CM

## 2025-03-17 PROCEDURE — 99213 OFFICE O/P EST LOW 20 MIN: CPT | Performed by: INTERNAL MEDICINE

## 2025-03-17 PROCEDURE — 1159F MED LIST DOCD IN RCRD: CPT | Performed by: INTERNAL MEDICINE

## 2025-03-17 PROCEDURE — 1160F RVW MEDS BY RX/DR IN RCRD: CPT | Performed by: INTERNAL MEDICINE

## 2025-03-17 RX ORDER — AMOXICILLIN AND CLAVULANATE POTASSIUM 500; 125 MG/1; MG/1
500 TABLET, FILM COATED ORAL 3 TIMES DAILY
Qty: 30 TABLET | Refills: 0 | Status: SHIPPED | OUTPATIENT
Start: 2025-03-17 | End: 2025-03-27

## 2025-03-17 ASSESSMENT — ENCOUNTER SYMPTOMS
SORE THROAT: 0
WHEEZING: 0
SHORTNESS OF BREATH: 0
MYALGIAS: 0
SWEATS: 0
HEADACHES: 1
CHILLS: 1
FEVER: 0
COUGH: 1
HEMOPTYSIS: 0
HEARTBURN: 0
WEIGHT LOSS: 0
RHINORRHEA: 0

## 2025-04-25 DIAGNOSIS — N40.1 BENIGN PROSTATIC HYPERPLASIA WITH LOWER URINARY TRACT SYMPTOMS, SYMPTOM DETAILS UNSPECIFIED: ICD-10-CM

## 2025-04-25 RX ORDER — FINASTERIDE 5 MG/1
5 TABLET, FILM COATED ORAL DAILY
Qty: 90 TABLET | Refills: 3 | Status: SHIPPED | OUTPATIENT
Start: 2025-04-25 | End: 2026-04-25

## 2025-06-18 ENCOUNTER — APPOINTMENT (OUTPATIENT)
Dept: PRIMARY CARE | Facility: CLINIC | Age: 70
End: 2025-06-18

## 2025-07-08 NOTE — PROGRESS NOTES
Subjective   Sven Gardiner is a 69 y.o. male who presents for here for physical exam .  HPI  Sven is 69 male history of benign essential hypertension gastroesophageal reflux disease enlarged prostate dyslipidemia here for a physical exam.  Patient takes his medication as prescribed. Patient is active physically active.  Patient is physically active without physical or cardiovascular symptoms patient complaining of low back pain to complete patient over-the-counter medications without improvement.  Pain is not the lower back radiating to buttock area patient cont to smoke over 22 years one pack now trying to cut down but having difficulties he does have non probative cough, mild mucus. Trigger finger rt index  Review of Systems  10 system review pertinent as above  Objective     Visit Vitals  /82   Pulse 72   Temp 36.5 °C (97.7 °F)   Resp 16          Physical Exam    HEENT: Atraumatic normocephalic the pupils are equal and round and reactive to light the sclerae nonicteric extraocular motion are intact.  Neck: Is supple without JVD no carotid bruits the trachea is midline there are no masses pulses are equal and bilateral with normal upstroke.  Skin: Normal.  Skin good texture.  Moist.  Good turgor.  No lesions, no rashes.  Lymph: No lymphadenopathy appreciated, no masses, no lesions  Lungs: Are clear to auscultation and percussion, good breath sounds bilaterally, no rhonchi, expiratory wheezing, good diaphragmatic excursion.  Heart: Normal rate and normal rhythm S1, S2, no S3, no gallop, murmur or rub.  Abdomen: Soft, nontender, no organomegaly, good bowel sounds.    Extremities: Full range of motion, good pulses bilateral.  No cyanosis, no clubbing or edema.  Neuro: Cranial nerves II-XII are grossly intact there is no sensory or motor deficits.  Able to move all extremities.      Assessment/Plan     Here for a physical    CBC BMP LIPID AST ALT Vit d Psa    Prevention    Colonoscopy 2022 Dr Orozco next 5  years 2027  Upper endoscopy needed Barrets Req given   CACS LAD 35.6            RCA  3.81  PSA 09/2023 Normal, check today 07/101/2025    Immunization  HD flu vaccine  Pneumovax  Needed  but declined  Shingles Declined at this time  Corona 2/2 plus 2     Rt trigger finger   Index mild especially at am  Will refer to ortho hand    Lower back pain  Core exercise  Patient will try    Tobacco abuse  CT chest low dose    Post nasal drip and a couh  Will try flonase      Here for lower back pain  Suspect sciatica    Fasting Blood Test    CBC BMP LIPID AST ALT PSA    GERD no new complain  With reflux  On omeprazole   Doing well follows with Dr Kt Orozco   No new complaints    Continue with the low-fat, low-cholesterol diet,  I recommended Mediterranean diet, which include fish, chicken, vegetables and olive oil  Exercise daily for 30 minutes at least 3 times a week  Continue home medications  Norvasc 5 mg daily.   CACS score is 39.41 11/2022  Continue with diet and exercise    Hypertension   No added salt diet, do not and salt to your food  Try to exercise every other day for 30 minutes  Continue current medications Norvasc as needed  Patient checks his pressure before he takes it  Take for systolic greater than 150 or diastolic greater than 90    History of benign prostatic hypertrophy   Nocturia X1  Normal PSA  Nocturia 1 or twice at night    Tobacco abuse  We consulted about smoking secession  Patient will try nicoderm ready to quit  Consultation 3-10 min      Problem List Items Addressed This Visit       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    Relevant Orders    Esophagogastroduodenoscopy (EGD) w Mann's Esophagus    CBC w/5 Part Differential, [facility]    BPH (benign prostatic hyperplasia)    Relevant Orders    Basic Metabolic Panel    PSA    Dyslipidemia    Relevant Orders    Lipid Panel    AST    ALT    HTN (hypertension), benign    Acute asthmatic bronchitis ([facility])    Tobacco abuse - Primary    Relevant  Medications    nicotine (Nicoderm CQ) 21 mg/24 hr patch    nicotine polacrilex (Nicorette) 4 mg gum    buPROPion SR (Wellbutrin SR) 150 mg 12 hr tablet    Other Relevant Orders    CT lung screening low dose    CBC w/5 Part Differential, [facility]    Vitamin D insufficiency    Relevant Orders    Vitamin D 25-Hydroxy,Total (for eval of Vitamin D levels)               Didier Pennington MD

## 2025-07-10 ENCOUNTER — APPOINTMENT (OUTPATIENT)
Dept: PRIMARY CARE | Facility: CLINIC | Age: 70
End: 2025-07-10

## 2025-07-10 VITALS
RESPIRATION RATE: 16 BRPM | DIASTOLIC BLOOD PRESSURE: 82 MMHG | HEIGHT: 66 IN | HEART RATE: 72 BPM | WEIGHT: 186 LBS | SYSTOLIC BLOOD PRESSURE: 122 MMHG | TEMPERATURE: 97.7 F | BODY MASS INDEX: 29.89 KG/M2

## 2025-07-10 DIAGNOSIS — I10 HTN (HYPERTENSION), BENIGN: ICD-10-CM

## 2025-07-10 DIAGNOSIS — Z00.00 PHYSICAL EXAM: Primary | ICD-10-CM

## 2025-07-10 DIAGNOSIS — J45.909 ACUTE ASTHMATIC BRONCHITIS (HHS-HCC): ICD-10-CM

## 2025-07-10 DIAGNOSIS — Z72.0 TOBACCO ABUSE: ICD-10-CM

## 2025-07-10 DIAGNOSIS — N40.1 BENIGN PROSTATIC HYPERPLASIA WITH LOWER URINARY TRACT SYMPTOMS, SYMPTOM DETAILS UNSPECIFIED: ICD-10-CM

## 2025-07-10 DIAGNOSIS — E78.5 DYSLIPIDEMIA: ICD-10-CM

## 2025-07-10 DIAGNOSIS — K22.70 BARRETT'S ESOPHAGUS WITHOUT DYSPLASIA: ICD-10-CM

## 2025-07-10 DIAGNOSIS — E55.9 VITAMIN D INSUFFICIENCY: ICD-10-CM

## 2025-07-10 DIAGNOSIS — M65.321 TRIGGER INDEX FINGER OF RIGHT HAND: ICD-10-CM

## 2025-07-10 LAB
25(OH)D3 SERPL-MCNC: 24 NG/ML (ref 30–100)
ALT SERPL W P-5'-P-CCNC: 27 U/L (ref 14–59)
ANION GAP SERPL CALC-SCNC: 13 MMOL/L (ref 10–20)
AST SERPL W P-5'-P-CCNC: 13 U/L (ref 15–37)
BASOPHILS # BLD AUTO: 0.05 X10*3/UL (ref 0.1–1.6)
BASOPHILS NFR BLD AUTO: 0.51 % (ref 0–0.3)
BUN SERPL-MCNC: 16 MG/DL (ref 7–18)
CALCIUM SERPL-MCNC: 10 MG/DL (ref 8.5–10.1)
CHLORIDE SERPL-SCNC: 101 MMOL/L (ref 98–107)
CHOLEST SERPL-MCNC: 157 MG/DL (ref 0–199)
CHOLESTEROL/HDL RATIO: 2.7 (ref 4.2–7)
CO2 SERPL-SCNC: 26 MMOL/L (ref 21–32)
CREAT SERPL-MCNC: 0.87 MG/DL (ref 0.6–1.1)
EGFRCR SERPLBLD CKD-EPI 2021: >90 ML/MIN/1.73M*2
EOSINOPHIL # BLD AUTO: 0.27 X10*3/UL (ref 0.04–0.5)
EOSINOPHIL NFR BLD AUTO: 2.62 % (ref 0.7–7)
ERYTHROCYTE [DISTWIDTH] IN BLOOD BY AUTOMATED COUNT: 14.8 % (ref 11.5–14.5)
GLUCOSE SERPL-MCNC: 90 MG/DL (ref 74–100)
HCT VFR BLD AUTO: 48.4 % (ref 38.4–51.3)
HDLC SERPL-MCNC: 59 MG/DL (ref 40–59)
HGB BLD-MCNC: 17.02 G/DL (ref 12.7–17)
IS PATIENT FASTING: YES
LDLC SERPL DIRECT ASSAY-MCNC: 86 MG/DL (ref 0–100)
LYMPHOCYTES # BLD AUTO: 4.71 X10*3/UL (ref 0–6)
LYMPHOCYTES NFR BLD AUTO: 46.44 % (ref 20.5–51.1)
MCH RBC QN AUTO: 30.4 PG (ref 26–32)
MCHC RBC AUTO-ENTMCNC: 35.2 G/DL (ref 31–38)
MCV RBC AUTO: 86.4 FL (ref 80–96)
MONOCYTES # BLD AUTO: 0.6 X10*3/UL (ref 1.6–24.9)
MONOCYTES NFR BLD AUTO: 5.93 % (ref 1.7–9.3)
NEUTROPHILS # BLD AUTO: 4.51 X10*3/UL (ref 1.4–6.5)
NEUTROPHILS NFR BLD AUTO: 44.5 % (ref 42.2–75.2)
PLATELET # BLD AUTO: 300.6 X10*3/UL (ref 150–450)
PMV BLD AUTO: 7.96 FL (ref 7.8–11)
POTASSIUM SERPL-SCNC: 4.2 MMOL/L (ref 3.5–5.1)
PSA SERPL-MCNC: 1.36 NG/ML
RBC # BLD AUTO: 5.6 X10*6/UL (ref 4.1–5.6)
SODIUM SERPL-SCNC: 136 MMOL/L (ref 136–145)
TRIGL SERPL-MCNC: 72 MG/DL
WBC # BLD AUTO: 10.14 X10*3/UL (ref 4.5–10.5)

## 2025-07-10 PROCEDURE — 99397 PER PM REEVAL EST PAT 65+ YR: CPT | Performed by: INTERNAL MEDICINE

## 2025-07-10 PROCEDURE — 3074F SYST BP LT 130 MM HG: CPT | Performed by: INTERNAL MEDICINE

## 2025-07-10 PROCEDURE — 3008F BODY MASS INDEX DOCD: CPT | Performed by: INTERNAL MEDICINE

## 2025-07-10 PROCEDURE — 3079F DIAST BP 80-89 MM HG: CPT | Performed by: INTERNAL MEDICINE

## 2025-07-10 PROCEDURE — 80061 LIPID PANEL: CPT | Performed by: INTERNAL MEDICINE

## 2025-07-10 PROCEDURE — 1125F AMNT PAIN NOTED PAIN PRSNT: CPT | Performed by: INTERNAL MEDICINE

## 2025-07-10 PROCEDURE — 84460 ALANINE AMINO (ALT) (SGPT): CPT | Performed by: INTERNAL MEDICINE

## 2025-07-10 PROCEDURE — 85025 COMPLETE CBC W/AUTO DIFF WBC: CPT | Performed by: INTERNAL MEDICINE

## 2025-07-10 PROCEDURE — 1158F ADVNC CARE PLAN TLK DOCD: CPT | Performed by: INTERNAL MEDICINE

## 2025-07-10 PROCEDURE — 1160F RVW MEDS BY RX/DR IN RCRD: CPT | Performed by: INTERNAL MEDICINE

## 2025-07-10 PROCEDURE — 1123F ACP DISCUSS/DSCN MKR DOCD: CPT | Performed by: INTERNAL MEDICINE

## 2025-07-10 PROCEDURE — 1159F MED LIST DOCD IN RCRD: CPT | Performed by: INTERNAL MEDICINE

## 2025-07-10 PROCEDURE — 1036F TOBACCO NON-USER: CPT | Performed by: INTERNAL MEDICINE

## 2025-07-10 RX ORDER — DIPHENHYDRAMINE HCL 25 MG
4 CAPSULE ORAL AS NEEDED
Qty: 100 EACH | Refills: 0 | Status: SHIPPED | OUTPATIENT
Start: 2025-07-10 | End: 2025-08-09

## 2025-07-10 RX ORDER — BUPROPION HYDROCHLORIDE 150 MG/1
150 TABLET, EXTENDED RELEASE ORAL 2 TIMES DAILY
Qty: 60 TABLET | Refills: 1 | Status: SHIPPED | OUTPATIENT
Start: 2025-07-10 | End: 2026-01-06

## 2025-07-10 RX ORDER — IBUPROFEN 200 MG
1 TABLET ORAL EVERY 24 HOURS
Qty: 30 PATCH | Refills: 0 | Status: SHIPPED | OUTPATIENT
Start: 2025-07-10 | End: 2025-08-09

## 2025-07-10 ASSESSMENT — ENCOUNTER SYMPTOMS
LOSS OF SENSATION IN FEET: 0
DEPRESSION: 0
OCCASIONAL FEELINGS OF UNSTEADINESS: 0

## 2025-07-10 ASSESSMENT — PAIN SCALES - GENERAL: PAINLEVEL_OUTOF10: 2

## 2025-07-23 ENCOUNTER — TELEPHONE (OUTPATIENT)
Dept: PRIMARY CARE | Facility: CLINIC | Age: 70
End: 2025-07-23
Payer: COMMERCIAL

## 2025-07-23 DIAGNOSIS — Z72.0 TOBACCO ABUSE: ICD-10-CM

## 2025-07-23 RX ORDER — IBUPROFEN 200 MG
1 TABLET ORAL EVERY 24 HOURS
Qty: 30 PATCH | Refills: 0 | Status: SHIPPED | OUTPATIENT
Start: 2025-07-23 | End: 2025-08-22

## 2025-07-31 ENCOUNTER — APPOINTMENT (OUTPATIENT)
Dept: RADIOLOGY | Facility: HOSPITAL | Age: 70
End: 2025-07-31
Payer: COMMERCIAL

## 2025-07-31 DIAGNOSIS — Z72.0 TOBACCO ABUSE: ICD-10-CM

## 2025-07-31 PROCEDURE — 71271 CT THORAX LUNG CANCER SCR C-: CPT

## 2025-07-31 PROCEDURE — 71271 CT THORAX LUNG CANCER SCR C-: CPT | Performed by: RADIOLOGY

## 2025-08-03 DIAGNOSIS — Z72.0 TOBACCO ABUSE: ICD-10-CM

## 2025-08-04 RX ORDER — BUPROPION HYDROCHLORIDE 150 MG/1
150 TABLET, EXTENDED RELEASE ORAL 2 TIMES DAILY
Qty: 180 TABLET | Refills: 1 | Status: SHIPPED | OUTPATIENT
Start: 2025-08-04 | End: 2026-01-31

## 2025-08-07 DIAGNOSIS — Z72.0 TOBACCO ABUSE: ICD-10-CM

## 2025-08-07 RX ORDER — DIPHENHYDRAMINE HCL 25 MG
4 CAPSULE ORAL AS NEEDED
Qty: 30 EACH | Refills: 0 | Status: SHIPPED | OUTPATIENT
Start: 2025-08-07 | End: 2025-08-08

## 2025-08-08 RX ORDER — DIPHENHYDRAMINE HCL 25 MG
4 CAPSULE ORAL AS NEEDED
Qty: 100 EACH | Refills: 0 | Status: SHIPPED | OUTPATIENT
Start: 2025-08-08 | End: 2025-09-07

## 2025-09-11 ENCOUNTER — APPOINTMENT (OUTPATIENT)
Dept: PRIMARY CARE | Facility: CLINIC | Age: 70
End: 2025-09-11
Payer: COMMERCIAL